# Patient Record
Sex: FEMALE | Race: WHITE | NOT HISPANIC OR LATINO
[De-identification: names, ages, dates, MRNs, and addresses within clinical notes are randomized per-mention and may not be internally consistent; named-entity substitution may affect disease eponyms.]

---

## 2017-03-10 ENCOUNTER — APPOINTMENT (OUTPATIENT)
Dept: RADIOLOGY | Facility: HOSPITAL | Age: 62
End: 2017-03-10

## 2017-03-10 ENCOUNTER — OUTPATIENT (OUTPATIENT)
Dept: OUTPATIENT SERVICES | Facility: HOSPITAL | Age: 62
LOS: 1 days | End: 2017-03-10
Payer: COMMERCIAL

## 2017-03-10 PROBLEM — Z00.00 ENCOUNTER FOR PREVENTIVE HEALTH EXAMINATION: Status: ACTIVE | Noted: 2017-03-10

## 2017-03-10 PROCEDURE — 74020: CPT

## 2017-03-10 PROCEDURE — 74020: CPT | Mod: 26

## 2017-03-13 ENCOUNTER — RESULT REVIEW (OUTPATIENT)
Age: 62
End: 2017-03-13

## 2017-03-13 ENCOUNTER — INPATIENT (INPATIENT)
Facility: HOSPITAL | Age: 62
LOS: 1 days | Discharge: HOME | End: 2017-03-15
Attending: SURGERY | Admitting: SURGERY
Payer: COMMERCIAL

## 2017-03-13 ENCOUNTER — APPOINTMENT (OUTPATIENT)
Dept: SURGERY | Facility: CLINIC | Age: 62
End: 2017-03-13

## 2017-03-13 VITALS
TEMPERATURE: 99 F | HEART RATE: 86 BPM | WEIGHT: 257.94 LBS | DIASTOLIC BLOOD PRESSURE: 82 MMHG | HEIGHT: 65 IN | RESPIRATION RATE: 16 BRPM | SYSTOLIC BLOOD PRESSURE: 142 MMHG

## 2017-03-13 VITALS
DIASTOLIC BLOOD PRESSURE: 84 MMHG | TEMPERATURE: 98.4 F | WEIGHT: 258 LBS | HEART RATE: 80 BPM | SYSTOLIC BLOOD PRESSURE: 119 MMHG | HEIGHT: 65 IN | BODY MASS INDEX: 42.99 KG/M2

## 2017-03-13 DIAGNOSIS — Z78.9 OTHER SPECIFIED HEALTH STATUS: ICD-10-CM

## 2017-03-13 DIAGNOSIS — K46.9 UNSPECIFIED ABDOMINAL HERNIA WITHOUT OBSTRUCTION OR GANGRENE: ICD-10-CM

## 2017-03-13 DIAGNOSIS — Z83.3 FAMILY HISTORY OF DIABETES MELLITUS: ICD-10-CM

## 2017-03-13 DIAGNOSIS — R73.03 PREDIABETES.: ICD-10-CM

## 2017-03-13 DIAGNOSIS — Z81.8 FAMILY HISTORY OF OTHER MENTAL AND BEHAVIORAL DISORDERS: ICD-10-CM

## 2017-03-13 DIAGNOSIS — K46.9 UNSPECIFIED ABDOMINAL HERNIA W/OUT OBSTRUCTION OR GANGRENE: ICD-10-CM

## 2017-03-13 LAB
ALBUMIN SERPL ELPH-MCNC: 3.5 G/DL — SIGNIFICANT CHANGE UP (ref 3.3–5)
ALP SERPL-CCNC: 158 U/L — HIGH (ref 40–120)
ALT FLD-CCNC: 19 U/L — SIGNIFICANT CHANGE UP (ref 4–33)
APTT BLD: 31.3 SEC — SIGNIFICANT CHANGE UP (ref 27.5–37.4)
AST SERPL-CCNC: 17 U/L — SIGNIFICANT CHANGE UP (ref 4–32)
BASE EXCESS BLDV CALC-SCNC: 8.4 MMOL/L — SIGNIFICANT CHANGE UP
BASOPHILS # BLD AUTO: 0.04 K/UL — SIGNIFICANT CHANGE UP (ref 0–0.2)
BASOPHILS NFR BLD AUTO: 0.3 % — SIGNIFICANT CHANGE UP (ref 0–2)
BILIRUB SERPL-MCNC: 0.5 MG/DL — SIGNIFICANT CHANGE UP (ref 0.2–1.2)
BLD GP AB SCN SERPL QL: NEGATIVE — SIGNIFICANT CHANGE UP
BLOOD GAS VENOUS - CREATININE: 0.73 MG/DL — SIGNIFICANT CHANGE UP (ref 0.5–1.3)
BUN SERPL-MCNC: 13 MG/DL — SIGNIFICANT CHANGE UP (ref 7–23)
CALCIUM SERPL-MCNC: 8.9 MG/DL — SIGNIFICANT CHANGE UP (ref 8.4–10.5)
CHLORIDE BLDV-SCNC: 104 MMOL/L — SIGNIFICANT CHANGE UP (ref 96–108)
CHLORIDE SERPL-SCNC: 100 MMOL/L — SIGNIFICANT CHANGE UP (ref 98–107)
CO2 SERPL-SCNC: 29 MMOL/L — SIGNIFICANT CHANGE UP (ref 22–31)
CREAT SERPL-MCNC: 0.79 MG/DL — SIGNIFICANT CHANGE UP (ref 0.5–1.3)
EOSINOPHIL # BLD AUTO: 0.24 K/UL — SIGNIFICANT CHANGE UP (ref 0–0.5)
EOSINOPHIL NFR BLD AUTO: 2 % — SIGNIFICANT CHANGE UP (ref 0–6)
GAS PNL BLDV: 140 MMOL/L — SIGNIFICANT CHANGE UP (ref 136–146)
GLUCOSE BLDV-MCNC: 107 — HIGH (ref 70–99)
GLUCOSE SERPL-MCNC: 103 MG/DL — HIGH (ref 70–99)
HCO3 BLDV-SCNC: 30 MMOL/L — HIGH (ref 20–27)
HCT VFR BLD CALC: 39.4 % — SIGNIFICANT CHANGE UP (ref 34.5–45)
HCT VFR BLDV CALC: 39.9 % — SIGNIFICANT CHANGE UP (ref 34.5–45)
HGB BLD-MCNC: 12.9 G/DL — SIGNIFICANT CHANGE UP (ref 11.5–15.5)
HGB BLDV-MCNC: 13 G/DL — SIGNIFICANT CHANGE UP (ref 11.5–15.5)
IMM GRANULOCYTES NFR BLD AUTO: 0.3 % — SIGNIFICANT CHANGE UP (ref 0–1.5)
INR BLD: 1.15 — SIGNIFICANT CHANGE UP (ref 0.87–1.18)
LACTATE BLDV-MCNC: 1.7 MMOL/L — SIGNIFICANT CHANGE UP (ref 0.5–2)
LYMPHOCYTES # BLD AUTO: 17 % — SIGNIFICANT CHANGE UP (ref 13–44)
LYMPHOCYTES # BLD AUTO: 2.04 K/UL — SIGNIFICANT CHANGE UP (ref 1–3.3)
MCHC RBC-ENTMCNC: 29.1 PG — SIGNIFICANT CHANGE UP (ref 27–34)
MCHC RBC-ENTMCNC: 32.7 % — SIGNIFICANT CHANGE UP (ref 32–36)
MCV RBC AUTO: 88.9 FL — SIGNIFICANT CHANGE UP (ref 80–100)
MONOCYTES # BLD AUTO: 1.15 K/UL — HIGH (ref 0–0.9)
MONOCYTES NFR BLD AUTO: 9.6 % — SIGNIFICANT CHANGE UP (ref 2–14)
NEUTROPHILS # BLD AUTO: 8.46 K/UL — HIGH (ref 1.8–7.4)
NEUTROPHILS NFR BLD AUTO: 70.8 % — SIGNIFICANT CHANGE UP (ref 43–77)
PCO2 BLDV: 52 MMHG — HIGH (ref 41–51)
PH BLDV: 7.42 PH — SIGNIFICANT CHANGE UP (ref 7.32–7.43)
PLATELET # BLD AUTO: 408 K/UL — HIGH (ref 150–400)
PMV BLD: 10.8 FL — SIGNIFICANT CHANGE UP (ref 7–13)
PO2 BLDV: < 24 MMHG — LOW (ref 35–40)
POTASSIUM BLDV-SCNC: 3.6 MMOL/L — SIGNIFICANT CHANGE UP (ref 3.4–4.5)
POTASSIUM SERPL-MCNC: 3.6 MMOL/L — SIGNIFICANT CHANGE UP (ref 3.5–5.3)
POTASSIUM SERPL-SCNC: 3.6 MMOL/L — SIGNIFICANT CHANGE UP (ref 3.5–5.3)
PROT SERPL-MCNC: 7.7 G/DL — SIGNIFICANT CHANGE UP (ref 6–8.3)
PROTHROM AB SERPL-ACNC: 13.1 SEC — SIGNIFICANT CHANGE UP (ref 10–13.1)
RBC # BLD: 4.43 M/UL — SIGNIFICANT CHANGE UP (ref 3.8–5.2)
RBC # FLD: 14.7 % — HIGH (ref 10.3–14.5)
RH IG SCN BLD-IMP: POSITIVE — SIGNIFICANT CHANGE UP
SAO2 % BLDV: 23.5 % — LOW (ref 60–85)
SODIUM SERPL-SCNC: 145 MMOL/L — SIGNIFICANT CHANGE UP (ref 135–145)
WBC # BLD: 11.97 K/UL — HIGH (ref 3.8–10.5)
WBC # FLD AUTO: 11.97 K/UL — HIGH (ref 3.8–10.5)

## 2017-03-13 PROCEDURE — 44005 FREEING OF BOWEL ADHESION: CPT | Mod: GC

## 2017-03-13 PROCEDURE — 74177 CT ABD & PELVIS W/CONTRAST: CPT | Mod: 26

## 2017-03-13 PROCEDURE — 99222 1ST HOSP IP/OBS MODERATE 55: CPT | Mod: 57,GC

## 2017-03-13 PROCEDURE — 71020: CPT | Mod: 26

## 2017-03-13 PROCEDURE — 49561: CPT | Mod: GC

## 2017-03-13 RX ORDER — SODIUM CHLORIDE 9 MG/ML
1000 INJECTION INTRAMUSCULAR; INTRAVENOUS; SUBCUTANEOUS ONCE
Qty: 0 | Refills: 0 | Status: COMPLETED | OUTPATIENT
Start: 2017-03-13 | End: 2017-03-13

## 2017-03-13 RX ADMIN — SODIUM CHLORIDE 2000 MILLILITER(S): 9 INJECTION INTRAMUSCULAR; INTRAVENOUS; SUBCUTANEOUS at 21:55

## 2017-03-13 NOTE — ED PROVIDER NOTE - OBJECTIVE STATEMENT
61 F h/o prediabetes, hernia, with worsening of hernia since tuesday. Patient had vomiting last week which caused hernia to bulge. Was seen by Dr Ndiaye today and told to go to ER for surgical evaluation. patient has no vomiting since last week, no nausea, no pain except once when bending over, last BM at 3pm, +passing gas. No fevers/chills. No dysuria. NO lightheaded/palpitations. 61 F h/o prediabetes, hernia, with worsening of hernia since tuesday. Patient had vomiting last week which caused hernia to bulge. Was seen by Dr Ndiaye today and told to go to ER for surgical evaluation. patient has no vomiting since last week, no nausea, no pain except once when bending over, last BM at 3pm, +passing gas. No fevers/chills. No dysuria. NO lightheaded/palpitations.    20:52 Marte attF h/o borderline dm, umbilical hernia neg repair sent to ED from General Surgeon office concern for strangulated hernia. Patient has longstanding history of umbilical hernia. Six days ago patient had stomach flu, frequent biliious nonbloody emesis, emesis prompted large non-reducible umbilcal mass. Sharp pain when bending forward, neg n/v, last bm 5 hours prior, still passing gas. Denies fever, diarrhea, travel, abx, recent hospitalization. PMH as above PSH denies MED denies

## 2017-03-13 NOTE — ED ADULT TRIAGE NOTE - CHIEF COMPLAINT QUOTE
Pt comes from DR Ndiaye's office to be admitted  for incarcerated hernia, pt had a recent stomach virus which exacerbated her hernia requiring surgery

## 2017-03-13 NOTE — ED PROVIDER NOTE - PHYSICAL EXAMINATION
Estuardo att: nad, comfortable appearing, walking in room, aaox3, ctabl, rrr, s1s2, abd soft 15cm x 10cm non-reducible umbilcal mass, mottled periumbilical skin, neg le edema

## 2017-03-13 NOTE — ED PROVIDER NOTE - ATTENDING CONTRIBUTION TO CARE
61F refer from surgical office large nonreducible umbilical hernia, neg active pain declined pain meds, overlying skin mottled, suspected strangulated bowel, PLAN cbc cmp vbg preop labs ct abd pelvis po and iv contrast surgical consult likely or

## 2017-03-13 NOTE — ED PROVIDER NOTE - MEDICAL DECISION MAKING DETAILS
61 F with incarcerated hernia, concern for strangulation though clinically well appearing currently. will get pre-op labs incl lactate, ct abd with contrast and surgery consult 61 F with incarcerated hernia, concern for strangulation though clinically well appearing currently. will get pre-op labs incl lactate, ct abd with contrast and surgery consult Estuardo att: 61F refer from surgical office large nonreducible umbilical hernia, neg active pain declined pain meds, overlying skin mottled, suspected strangulated bowel, PLAN cbc cmp vbg preop labs ct abd pelvis po and iv contrast surgical consult likely or

## 2017-03-14 LAB
BUN SERPL-MCNC: 13 MG/DL — SIGNIFICANT CHANGE UP (ref 7–23)
BUN SERPL-MCNC: 9 MG/DL — SIGNIFICANT CHANGE UP (ref 7–23)
CALCIUM SERPL-MCNC: 8.3 MG/DL — LOW (ref 8.4–10.5)
CALCIUM SERPL-MCNC: 8.6 MG/DL — SIGNIFICANT CHANGE UP (ref 8.4–10.5)
CHLORIDE SERPL-SCNC: 99 MMOL/L — SIGNIFICANT CHANGE UP (ref 98–107)
CHLORIDE SERPL-SCNC: 99 MMOL/L — SIGNIFICANT CHANGE UP (ref 98–107)
CO2 SERPL-SCNC: 25 MMOL/L — SIGNIFICANT CHANGE UP (ref 22–31)
CO2 SERPL-SCNC: 31 MMOL/L — SIGNIFICANT CHANGE UP (ref 22–31)
CREAT SERPL-MCNC: 0.7 MG/DL — SIGNIFICANT CHANGE UP (ref 0.5–1.3)
CREAT SERPL-MCNC: 0.73 MG/DL — SIGNIFICANT CHANGE UP (ref 0.5–1.3)
GLUCOSE SERPL-MCNC: 102 MG/DL — HIGH (ref 70–99)
GLUCOSE SERPL-MCNC: 125 MG/DL — HIGH (ref 70–99)
HBA1C BLD-MCNC: 6.2 % — HIGH (ref 4–5.6)
HCT VFR BLD CALC: 35.2 % — SIGNIFICANT CHANGE UP (ref 34.5–45)
HGB BLD-MCNC: 11.3 G/DL — LOW (ref 11.5–15.5)
MAGNESIUM SERPL-MCNC: 1.9 MG/DL — SIGNIFICANT CHANGE UP (ref 1.6–2.6)
MCHC RBC-ENTMCNC: 28.5 PG — SIGNIFICANT CHANGE UP (ref 27–34)
MCHC RBC-ENTMCNC: 32.1 % — SIGNIFICANT CHANGE UP (ref 32–36)
MCV RBC AUTO: 88.9 FL — SIGNIFICANT CHANGE UP (ref 80–100)
PHOSPHATE SERPL-MCNC: 3.2 MG/DL — SIGNIFICANT CHANGE UP (ref 2.5–4.5)
PLATELET # BLD AUTO: 368 K/UL — SIGNIFICANT CHANGE UP (ref 150–400)
PMV BLD: 11.2 FL — SIGNIFICANT CHANGE UP (ref 7–13)
POTASSIUM SERPL-MCNC: 3.1 MMOL/L — LOW (ref 3.5–5.3)
POTASSIUM SERPL-MCNC: 4.2 MMOL/L — SIGNIFICANT CHANGE UP (ref 3.5–5.3)
POTASSIUM SERPL-SCNC: 3.1 MMOL/L — LOW (ref 3.5–5.3)
POTASSIUM SERPL-SCNC: 4.2 MMOL/L — SIGNIFICANT CHANGE UP (ref 3.5–5.3)
RBC # BLD: 3.96 M/UL — SIGNIFICANT CHANGE UP (ref 3.8–5.2)
RBC # FLD: 14.9 % — HIGH (ref 10.3–14.5)
SODIUM SERPL-SCNC: 142 MMOL/L — SIGNIFICANT CHANGE UP (ref 135–145)
SODIUM SERPL-SCNC: 142 MMOL/L — SIGNIFICANT CHANGE UP (ref 135–145)
WBC # BLD: 11.39 K/UL — HIGH (ref 3.8–10.5)
WBC # FLD AUTO: 11.39 K/UL — HIGH (ref 3.8–10.5)

## 2017-03-14 PROCEDURE — 88302 TISSUE EXAM BY PATHOLOGIST: CPT | Mod: 26

## 2017-03-14 RX ORDER — HEPARIN SODIUM 5000 [USP'U]/ML
7500 INJECTION INTRAVENOUS; SUBCUTANEOUS EVERY 8 HOURS
Qty: 0 | Refills: 0 | Status: DISCONTINUED | OUTPATIENT
Start: 2017-03-14 | End: 2017-03-15

## 2017-03-14 RX ORDER — DEXTROSE 50 % IN WATER 50 %
25 SYRINGE (ML) INTRAVENOUS ONCE
Qty: 0 | Refills: 0 | Status: DISCONTINUED | OUTPATIENT
Start: 2017-03-14 | End: 2017-03-14

## 2017-03-14 RX ORDER — DEXTROSE 50 % IN WATER 50 %
12.5 SYRINGE (ML) INTRAVENOUS ONCE
Qty: 0 | Refills: 0 | Status: DISCONTINUED | OUTPATIENT
Start: 2017-03-14 | End: 2017-03-14

## 2017-03-14 RX ORDER — INSULIN LISPRO 100/ML
VIAL (ML) SUBCUTANEOUS EVERY 6 HOURS
Qty: 0 | Refills: 0 | Status: DISCONTINUED | OUTPATIENT
Start: 2017-03-14 | End: 2017-03-14

## 2017-03-14 RX ORDER — ONDANSETRON 8 MG/1
4 TABLET, FILM COATED ORAL ONCE
Qty: 0 | Refills: 0 | Status: DISCONTINUED | OUTPATIENT
Start: 2017-03-14 | End: 2017-03-14

## 2017-03-14 RX ORDER — DEXTROSE 50 % IN WATER 50 %
1 SYRINGE (ML) INTRAVENOUS ONCE
Qty: 0 | Refills: 0 | Status: DISCONTINUED | OUTPATIENT
Start: 2017-03-14 | End: 2017-03-14

## 2017-03-14 RX ORDER — POTASSIUM CHLORIDE 20 MEQ
40 PACKET (EA) ORAL EVERY 4 HOURS
Qty: 0 | Refills: 0 | Status: COMPLETED | OUTPATIENT
Start: 2017-03-14 | End: 2017-03-14

## 2017-03-14 RX ORDER — INSULIN LISPRO 100/ML
VIAL (ML) SUBCUTANEOUS
Qty: 0 | Refills: 0 | Status: DISCONTINUED | OUTPATIENT
Start: 2017-03-14 | End: 2017-03-15

## 2017-03-14 RX ORDER — DEXTROSE MONOHYDRATE, SODIUM CHLORIDE, AND POTASSIUM CHLORIDE 50; .745; 4.5 G/1000ML; G/1000ML; G/1000ML
1000 INJECTION, SOLUTION INTRAVENOUS
Qty: 0 | Refills: 0 | Status: DISCONTINUED | OUTPATIENT
Start: 2017-03-14 | End: 2017-03-15

## 2017-03-14 RX ORDER — GLUCAGON INJECTION, SOLUTION 0.5 MG/.1ML
1 INJECTION, SOLUTION SUBCUTANEOUS ONCE
Qty: 0 | Refills: 0 | Status: DISCONTINUED | OUTPATIENT
Start: 2017-03-14 | End: 2017-03-14

## 2017-03-14 RX ORDER — SODIUM CHLORIDE 9 MG/ML
1000 INJECTION, SOLUTION INTRAVENOUS
Qty: 0 | Refills: 0 | Status: DISCONTINUED | OUTPATIENT
Start: 2017-03-14 | End: 2017-03-14

## 2017-03-14 RX ORDER — DEXTROSE MONOHYDRATE, SODIUM CHLORIDE, AND POTASSIUM CHLORIDE 50; .745; 4.5 G/1000ML; G/1000ML; G/1000ML
1000 INJECTION, SOLUTION INTRAVENOUS
Qty: 0 | Refills: 0 | Status: DISCONTINUED | OUTPATIENT
Start: 2017-03-14 | End: 2017-03-14

## 2017-03-14 RX ORDER — HYDROMORPHONE HYDROCHLORIDE 2 MG/ML
0.5 INJECTION INTRAMUSCULAR; INTRAVENOUS; SUBCUTANEOUS
Qty: 0 | Refills: 0 | Status: DISCONTINUED | OUTPATIENT
Start: 2017-03-14 | End: 2017-03-14

## 2017-03-14 RX ADMIN — HEPARIN SODIUM 7500 UNIT(S): 5000 INJECTION INTRAVENOUS; SUBCUTANEOUS at 13:32

## 2017-03-14 RX ADMIN — DEXTROSE MONOHYDRATE, SODIUM CHLORIDE, AND POTASSIUM CHLORIDE 50 MILLILITER(S): 50; .745; 4.5 INJECTION, SOLUTION INTRAVENOUS at 17:04

## 2017-03-14 RX ADMIN — SODIUM CHLORIDE 150 MILLILITER(S): 9 INJECTION, SOLUTION INTRAVENOUS at 10:45

## 2017-03-14 RX ADMIN — HEPARIN SODIUM 7500 UNIT(S): 5000 INJECTION INTRAVENOUS; SUBCUTANEOUS at 05:46

## 2017-03-14 RX ADMIN — HYDROMORPHONE HYDROCHLORIDE 0.5 MILLIGRAM(S): 2 INJECTION INTRAMUSCULAR; INTRAVENOUS; SUBCUTANEOUS at 03:59

## 2017-03-14 RX ADMIN — Medication 40 MILLIEQUIVALENT(S): at 13:32

## 2017-03-14 RX ADMIN — HYDROMORPHONE HYDROCHLORIDE 0.5 MILLIGRAM(S): 2 INJECTION INTRAMUSCULAR; INTRAVENOUS; SUBCUTANEOUS at 03:45

## 2017-03-14 RX ADMIN — Medication 40 MILLIEQUIVALENT(S): at 11:06

## 2017-03-14 RX ADMIN — HEPARIN SODIUM 7500 UNIT(S): 5000 INJECTION INTRAVENOUS; SUBCUTANEOUS at 21:36

## 2017-03-14 NOTE — H&P ADULT. - HISTORY OF PRESENT ILLNESS
61F prediabetic, obese, no PSHx p/w severe abdominal pain for the past week over a known ventral hernia which she has had for the past 19 years. The patient has no obstructive symptoms. Only c/o pain and recently noted skin changes over the hernia.

## 2017-03-14 NOTE — PRE-OP CHECKLIST - PATIENT'S PERSONAL PROPERTY REMOVED
glasses/cell phone, laptop, 3 yellow-toned rings cell phone, laptop, 3 yellow-toned rings, suitcase with clothing/toiletries/glasses

## 2017-03-14 NOTE — H&P ADULT. - ASSESSMENT
61F obese, prediabetes no w/ incarcerated ventral hernia, concern for strangulation  - NPO  - IVF  - Pain control  - OR plan for urgent reduction and hernia repair.  - DVT PPx

## 2017-03-14 NOTE — BRIEF OPERATIVE NOTE - OPERATION/FINDINGS
OPERATION: repair of incarcerated ventral hernia    FINDINGS: erythematous overlying skin; hernia sac opened - infarcted omentum, viable small bowel, and ascites; hernia sac resected with Ligasure; fascia closed primarily with interrupted figure of eights; subcutaneous tissue approximated w/ Vicryl sutures; skin closed w/ staples

## 2017-03-15 ENCOUNTER — TRANSCRIPTION ENCOUNTER (OUTPATIENT)
Age: 62
End: 2017-03-15

## 2017-03-15 VITALS
OXYGEN SATURATION: 97 % | TEMPERATURE: 99 F | DIASTOLIC BLOOD PRESSURE: 80 MMHG | HEART RATE: 86 BPM | SYSTOLIC BLOOD PRESSURE: 122 MMHG | RESPIRATION RATE: 18 BRPM

## 2017-03-15 LAB
BUN SERPL-MCNC: 6 MG/DL — LOW (ref 7–23)
CALCIUM SERPL-MCNC: 7.8 MG/DL — LOW (ref 8.4–10.5)
CHLORIDE SERPL-SCNC: 102 MMOL/L — SIGNIFICANT CHANGE UP (ref 98–107)
CO2 SERPL-SCNC: 26 MMOL/L — SIGNIFICANT CHANGE UP (ref 22–31)
CREAT SERPL-MCNC: 0.64 MG/DL — SIGNIFICANT CHANGE UP (ref 0.5–1.3)
GLUCOSE SERPL-MCNC: 128 MG/DL — HIGH (ref 70–99)
HCT VFR BLD CALC: 35.8 % — SIGNIFICANT CHANGE UP (ref 34.5–45)
HGB BLD-MCNC: 11.3 G/DL — LOW (ref 11.5–15.5)
MAGNESIUM SERPL-MCNC: 1.9 MG/DL — SIGNIFICANT CHANGE UP (ref 1.6–2.6)
MCHC RBC-ENTMCNC: 28.3 PG — SIGNIFICANT CHANGE UP (ref 27–34)
MCHC RBC-ENTMCNC: 31.6 % — LOW (ref 32–36)
MCV RBC AUTO: 89.5 FL — SIGNIFICANT CHANGE UP (ref 80–100)
PHOSPHATE SERPL-MCNC: 3.7 MG/DL — SIGNIFICANT CHANGE UP (ref 2.5–4.5)
PLATELET # BLD AUTO: 378 K/UL — SIGNIFICANT CHANGE UP (ref 150–400)
PMV BLD: 10.8 FL — SIGNIFICANT CHANGE UP (ref 7–13)
POTASSIUM SERPL-MCNC: 3.5 MMOL/L — SIGNIFICANT CHANGE UP (ref 3.5–5.3)
POTASSIUM SERPL-SCNC: 3.5 MMOL/L — SIGNIFICANT CHANGE UP (ref 3.5–5.3)
RBC # BLD: 4 M/UL — SIGNIFICANT CHANGE UP (ref 3.8–5.2)
RBC # FLD: 14.9 % — HIGH (ref 10.3–14.5)
SODIUM SERPL-SCNC: 141 MMOL/L — SIGNIFICANT CHANGE UP (ref 135–145)
WBC # BLD: 10.26 K/UL — SIGNIFICANT CHANGE UP (ref 3.8–10.5)
WBC # FLD AUTO: 10.26 K/UL — SIGNIFICANT CHANGE UP (ref 3.8–10.5)

## 2017-03-15 RX ORDER — OXYCODONE AND ACETAMINOPHEN 5; 325 MG/1; MG/1
1 TABLET ORAL
Qty: 12 | Refills: 0
Start: 2017-03-15 | End: 2017-03-18

## 2017-03-15 RX ORDER — POTASSIUM CHLORIDE 20 MEQ
40 PACKET (EA) ORAL ONCE
Qty: 0 | Refills: 0 | Status: COMPLETED | OUTPATIENT
Start: 2017-03-15 | End: 2017-03-15

## 2017-03-15 RX ORDER — OXYCODONE HYDROCHLORIDE 5 MG/1
1 TABLET ORAL
Qty: 12 | Refills: 0 | OUTPATIENT
Start: 2017-03-15 | End: 2017-03-18

## 2017-03-15 RX ORDER — CALCIUM CARBONATE 500(1250)
1 TABLET ORAL
Qty: 0 | Refills: 0 | COMMUNITY
Start: 2017-03-15

## 2017-03-15 RX ORDER — CALCIUM CARBONATE 500(1250)
1 TABLET ORAL EVERY 4 HOURS
Qty: 0 | Refills: 0 | Status: DISCONTINUED | OUTPATIENT
Start: 2017-03-15 | End: 2017-03-15

## 2017-03-15 RX ADMIN — HEPARIN SODIUM 7500 UNIT(S): 5000 INJECTION INTRAVENOUS; SUBCUTANEOUS at 05:58

## 2017-03-15 RX ADMIN — Medication 40 MILLIEQUIVALENT(S): at 08:14

## 2017-03-15 NOTE — DISCHARGE NOTE ADULT - CARE PROVIDERS DIRECT ADDRESSES
[email or web address]

## 2017-03-15 NOTE — DISCHARGE NOTE ADULT - HOSPITAL COURSE
61F prediabetic, obese, no PSHx p/w severe abdominal pain for the past week over a known ventral hernia which she has had for the past 19 years. The patient has no obstructive symptoms. Only c/o pain and recently noted skin changes over the hernia.  3/14: Patient taken to operating room for ventral hernia repair. Postoperatively patient taken to PACU and transferred to floor after PACU criteria met. Diet was advanced to clears. Patient is ambulating on own and voiding on own.  3/15: Diet advanced to regular. Vital signs remain stable. Pain well controlled on current pain medications. Patient stable for discharge home with outpatient follow up with surgeon within one week.   istop#: [id number]

## 2017-03-15 NOTE — DISCHARGE NOTE ADULT - MEDICATION SUMMARY - MEDICATIONS TO TAKE
I will START or STAY ON the medications listed below when I get home from the hospital:    acetaminophen-oxyCODONE 325 mg-5 mg oral tablet  -- 1 tab(s) by mouth every 6 hours, As Needed -for moderate to severe pain MDD:4  -- Caution federal law prohibits the transfer of this drug to any person other  than the person for whom it was prescribed.  May cause drowsiness.  Alcohol may intensify this effect.  Use care when operating dangerous machinery.  This prescription cannot be refilled.  This product contains acetaminophen.  Do not use  with any other product containing acetaminophen to prevent possible liver damage.  Using more of this medication than prescribed may cause serious breathing problems.    -- Indication: For moderate to severe pain    calcium carbonate 500 mg (200 mg elemental calcium) oral tablet, chewable  -- 1 tab(s) by mouth every 4 hours, As needed, Heartburn  -- Indication: For As needed for heartburn    metFORMIN 500 mg oral tablet  -- 1 tab(s) by mouth every other day  -- Indication: For DM I will START or STAY ON the medications listed below when I get home from the hospital:    acetaminophen-oxyCODONE 325 mg-5 mg oral tablet  -- 1 tab(s) by mouth every 6 hours, As Needed -for moderate to severe pain MDD:4  -- Caution federal law prohibits the transfer of this drug to any person other  than the person for whom it was prescribed.  May cause drowsiness.  Alcohol may intensify this effect.  Use care when operating dangerous machinery.  This prescription cannot be refilled.  This product contains acetaminophen.  Do not use  with any other product containing acetaminophen to prevent possible liver damage.  Using more of this medication than prescribed may cause serious breathing problems.    -- Indication: For As needed for pain    calcium carbonate 500 mg (200 mg elemental calcium) oral tablet, chewable  -- 1 tab(s) by mouth every 4 hours, As needed, Heartburn  -- Indication: For As needed for heartburn    metFORMIN 500 mg oral tablet  -- 1 tab(s) by mouth every other day  -- Indication: For DM

## 2017-03-15 NOTE — DISCHARGE NOTE ADULT - CARE PLAN
Principal Discharge DX:	Hernia  Goal:	You had surgery, pain control  Instructions for follow-up, activity and diet:	WOUND CARE:  Keep incision clean, dry and in tact   BATHING: Please do not submerge wound underwater. You may shower and/or sponge bathe.  ACTIVITY: No heavy lifting or straining. Otherwise, you may return to your usual level of physical activity. If you are taking narcotic pain medication (such as Percocet) DO NOT drive a car, operate machinery or make important decisions.  DIET: Return to your usual diet.  NOTIFY YOUR SURGEON IF: You have any bleeding that does not stop, any pus draining from your wound(s), any fever (over 100.4 F) or chills, persistent nausea/vomiting, persistent diarrhea, or if your pain is not controlled on your discharge pain medications.  FOLLOW-UP: Please follow up with your primary care physician in one week regarding your hospitalization. Please call Dr. Ndiaye to make an appointment in one week [phone] Principal Discharge DX:	Hernia  Goal:	You had surgery, pain control  Instructions for follow-up, activity and diet:	WOUND CARE:  Keep incision clean, dry and in tact   BATHING: Please do not submerge wound underwater. You may shower and/or sponge bathe.  ACTIVITY: No heavy lifting or straining. Otherwise, you may return to your usual level of physical activity. If you are taking narcotic pain medication (such as Percocet) DO NOT drive a car, operate machinery or make important decisions.  DIET: Return to your usual diet.  NOTIFY YOUR SURGEON IF: You have any bleeding that does not stop, any pus draining from your wound(s), any fever (over 100.4 F) or chills, persistent nausea/vomiting, persistent diarrhea, or if your pain is not controlled on your discharge pain medications.  FOLLOW-UP: Please follow up with your primary care physician in one week regarding your hospitalization. Please call Dr. Ndiaye to make an appointment in one week [phone] Principal Discharge DX:	Hernia  Goal:	You had surgery, pain control  Instructions for follow-up, activity and diet:	WOUND CARE:  Keep incision clean, dry and in tact   BATHING: Please do not submerge wound underwater. You may shower and/or sponge bathe.  ACTIVITY: No heavy lifting or straining. Otherwise, you may return to your usual level of physical activity. If you are taking narcotic pain medication (such as Percocet) DO NOT drive a car, operate machinery or make important decisions.  DIET: Return to your usual diet.  NOTIFY YOUR SURGEON IF: You have any bleeding that does not stop, any pus draining from your wound(s), any fever (over 100.4 F) or chills, persistent nausea/vomiting, persistent diarrhea, or if your pain is not controlled on your discharge pain medications.  FOLLOW-UP: Please follow up with your primary care physician in one week regarding your hospitalization. Please call Dr. Ndiaye to make an appointment in one week [phone]

## 2017-03-15 NOTE — DISCHARGE NOTE ADULT - ADDITIONAL INSTRUCTIONS
Please call Dr. Ndiaye to make an appointment in one week [phone]   Please follow up with primary medical doctor within one week of discharge regarding hospitalization.

## 2017-03-15 NOTE — DISCHARGE NOTE ADULT - CARE PROVIDER_API CALL
Myron Ndiaye), ColonRectal Surgery; Surgery  [address] [place] [address]  Phone: [phone]  Fax: [phone]

## 2017-03-15 NOTE — DISCHARGE NOTE ADULT - PATIENT PORTAL LINK FT
“You can access the FollowHealth Patient Portal, offered by [facility], by registering with the following website: [email or web address]”

## 2017-03-15 NOTE — DISCHARGE NOTE ADULT - PLAN OF CARE
You had surgery, pain control WOUND CARE:  Keep incision clean, dry and in tact   BATHING: Please do not submerge wound underwater. You may shower and/or sponge bathe.  ACTIVITY: No heavy lifting or straining. Otherwise, you may return to your usual level of physical activity. If you are taking narcotic pain medication (such as Percocet) DO NOT drive a car, operate machinery or make important decisions.  DIET: Return to your usual diet.  NOTIFY YOUR SURGEON IF: You have any bleeding that does not stop, any pus draining from your wound(s), any fever (over 100.4 F) or chills, persistent nausea/vomiting, persistent diarrhea, or if your pain is not controlled on your discharge pain medications.  FOLLOW-UP: Please follow up with your primary care physician in one week regarding your hospitalization. Please call Dr. Ndiaye to make an appointment in one week [phone]

## 2017-03-20 LAB — SURGICAL PATHOLOGY STUDY: SIGNIFICANT CHANGE UP

## 2017-03-22 ENCOUNTER — APPOINTMENT (OUTPATIENT)
Dept: SURGERY | Facility: CLINIC | Age: 62
End: 2017-03-22

## 2017-03-22 VITALS
WEIGHT: 258 LBS | TEMPERATURE: 97.6 F | HEIGHT: 65 IN | SYSTOLIC BLOOD PRESSURE: 148 MMHG | DIASTOLIC BLOOD PRESSURE: 84 MMHG | HEART RATE: 68 BPM | BODY MASS INDEX: 42.99 KG/M2

## 2017-03-27 ENCOUNTER — APPOINTMENT (OUTPATIENT)
Dept: SURGERY | Facility: CLINIC | Age: 62
End: 2017-03-27

## 2017-03-27 VITALS
TEMPERATURE: 98 F | HEIGHT: 65 IN | BODY MASS INDEX: 42.99 KG/M2 | DIASTOLIC BLOOD PRESSURE: 73 MMHG | HEART RATE: 72 BPM | WEIGHT: 258 LBS | SYSTOLIC BLOOD PRESSURE: 125 MMHG

## 2017-04-12 ENCOUNTER — APPOINTMENT (OUTPATIENT)
Dept: SURGERY | Facility: CLINIC | Age: 62
End: 2017-04-12

## 2017-04-12 VITALS
HEART RATE: 70 BPM | SYSTOLIC BLOOD PRESSURE: 148 MMHG | HEIGHT: 65 IN | WEIGHT: 258 LBS | DIASTOLIC BLOOD PRESSURE: 84 MMHG | BODY MASS INDEX: 42.99 KG/M2

## 2017-04-12 DIAGNOSIS — Z09 ENCOUNTER FOR FOLLOW-UP EXAMINATION AFTER COMPLETED TREATMENT FOR CONDITIONS OTHER THAN MALIGNANT NEOPLASM: ICD-10-CM

## 2017-05-10 ENCOUNTER — APPOINTMENT (OUTPATIENT)
Dept: SURGERY | Facility: CLINIC | Age: 62
End: 2017-05-10

## 2017-05-10 VITALS
TEMPERATURE: 97.9 F | BODY MASS INDEX: 42.99 KG/M2 | HEART RATE: 67 BPM | HEIGHT: 65 IN | DIASTOLIC BLOOD PRESSURE: 91 MMHG | SYSTOLIC BLOOD PRESSURE: 151 MMHG | WEIGHT: 258 LBS

## 2017-11-02 PROBLEM — R73.03 PREDIABETES: Chronic | Status: ACTIVE | Noted: 2017-03-13

## 2017-11-30 ENCOUNTER — APPOINTMENT (OUTPATIENT)
Dept: CARDIOLOGY | Facility: CLINIC | Age: 62
End: 2017-11-30
Payer: COMMERCIAL

## 2017-11-30 ENCOUNTER — NON-APPOINTMENT (OUTPATIENT)
Age: 62
End: 2017-11-30

## 2017-11-30 VITALS
SYSTOLIC BLOOD PRESSURE: 160 MMHG | HEIGHT: 65 IN | OXYGEN SATURATION: 97 % | DIASTOLIC BLOOD PRESSURE: 85 MMHG | HEART RATE: 73 BPM

## 2017-11-30 VITALS — SYSTOLIC BLOOD PRESSURE: 147 MMHG | DIASTOLIC BLOOD PRESSURE: 82 MMHG

## 2017-11-30 DIAGNOSIS — E78.5 HYPERLIPIDEMIA, UNSPECIFIED: ICD-10-CM

## 2017-11-30 DIAGNOSIS — Z86.79 PERSONAL HISTORY OF OTHER DISEASES OF THE CIRCULATORY SYSTEM: ICD-10-CM

## 2017-11-30 DIAGNOSIS — R73.01 IMPAIRED FASTING GLUCOSE: ICD-10-CM

## 2017-11-30 DIAGNOSIS — M85.80 OTHER SPECIFIED DISORDERS OF BONE DENSITY AND STRUCTURE, UNSPECIFIED SITE: ICD-10-CM

## 2017-11-30 DIAGNOSIS — R70.0 ELEVATED ERYTHROCYTE SEDIMENTATION RATE: ICD-10-CM

## 2017-11-30 PROCEDURE — 99244 OFF/OP CNSLTJ NEW/EST MOD 40: CPT

## 2017-11-30 PROCEDURE — 93306 TTE W/DOPPLER COMPLETE: CPT

## 2017-11-30 PROCEDURE — 93000 ELECTROCARDIOGRAM COMPLETE: CPT

## 2017-11-30 RX ORDER — METFORMIN HYDROCHLORIDE 625 MG/1
TABLET ORAL
Refills: 0 | Status: DISCONTINUED | COMMUNITY
End: 2017-11-30

## 2020-04-28 ENCOUNTER — APPOINTMENT (OUTPATIENT)
Dept: ULTRASOUND IMAGING | Facility: IMAGING CENTER | Age: 65
End: 2020-04-28
Payer: COMMERCIAL

## 2020-04-28 ENCOUNTER — OUTPATIENT (OUTPATIENT)
Dept: OUTPATIENT SERVICES | Facility: HOSPITAL | Age: 65
LOS: 1 days | End: 2020-04-28
Payer: COMMERCIAL

## 2020-04-28 DIAGNOSIS — Z00.8 ENCOUNTER FOR OTHER GENERAL EXAMINATION: ICD-10-CM

## 2020-04-28 PROCEDURE — 76830 TRANSVAGINAL US NON-OB: CPT

## 2020-04-28 PROCEDURE — 76856 US EXAM PELVIC COMPLETE: CPT

## 2020-04-28 PROCEDURE — 76856 US EXAM PELVIC COMPLETE: CPT | Mod: 26

## 2020-04-28 PROCEDURE — 76830 TRANSVAGINAL US NON-OB: CPT | Mod: 26

## 2020-05-07 ENCOUNTER — RESULT REVIEW (OUTPATIENT)
Age: 65
End: 2020-05-07

## 2021-06-24 ENCOUNTER — NON-APPOINTMENT (OUTPATIENT)
Age: 66
End: 2021-06-24

## 2021-06-24 ENCOUNTER — APPOINTMENT (OUTPATIENT)
Dept: CARDIOLOGY | Facility: CLINIC | Age: 66
End: 2021-06-24
Payer: COMMERCIAL

## 2021-06-24 VITALS
HEART RATE: 77 BPM | DIASTOLIC BLOOD PRESSURE: 88 MMHG | RESPIRATION RATE: 16 BRPM | HEIGHT: 64 IN | SYSTOLIC BLOOD PRESSURE: 147 MMHG | OXYGEN SATURATION: 95 % | TEMPERATURE: 97.6 F

## 2021-06-24 VITALS — SYSTOLIC BLOOD PRESSURE: 140 MMHG | DIASTOLIC BLOOD PRESSURE: 82 MMHG

## 2021-06-24 DIAGNOSIS — R94.31 ABNORMAL ELECTROCARDIOGRAM [ECG] [EKG]: ICD-10-CM

## 2021-06-24 DIAGNOSIS — Z87.891 PERSONAL HISTORY OF NICOTINE DEPENDENCE: ICD-10-CM

## 2021-06-24 PROCEDURE — 99072 ADDL SUPL MATRL&STAF TM PHE: CPT

## 2021-06-24 PROCEDURE — 93000 ELECTROCARDIOGRAM COMPLETE: CPT

## 2021-06-24 PROCEDURE — 93306 TTE W/DOPPLER COMPLETE: CPT

## 2021-06-24 PROCEDURE — 99204 OFFICE O/P NEW MOD 45 MIN: CPT

## 2021-06-24 NOTE — PHYSICAL EXAM
[Well Nourished] : well nourished [No Acute Distress] : no acute distress [Obese] : obese [Normal] : soft, non-tender, no masses/organomegaly, normal bowel sounds

## 2021-06-24 NOTE — ASSESSMENT
[FreeTextEntry1] : 65-year-old woman with history of morbid obesity improved.  She has dyspnea on exertion.  History of hypertension and elevated glucose not currently on medication.

## 2021-06-24 NOTE — DISCUSSION/SUMMARY
[FreeTextEntry1] : Discussed with patient.  Recommended blood work-up and follow with PMD.  Diet exercise and weight loss.  Nuclear stress testing and echocardiography regarding her dyspnea.

## 2021-06-24 NOTE — REASON FOR VISIT
[Symptom and Test Evaluation] : symptom and test evaluation [CV Risk Factors and Non-Cardiac Disease] : CV risk factors and non-cardiac disease [FreeTextEntry1] : Seen after several year hiatus.  History of morbid obesity elevated blood pressure previously.  Adheres to a Addie Christian diet.  Is no longer on medications.\par \par She has some dyspnea with exertion which she attributes to her weight.  She has no chest pain or palpitations.

## 2021-07-20 ENCOUNTER — APPOINTMENT (OUTPATIENT)
Dept: MAMMOGRAPHY | Facility: CLINIC | Age: 66
End: 2021-07-20

## 2021-07-29 ENCOUNTER — APPOINTMENT (OUTPATIENT)
Dept: CARDIOLOGY | Facility: CLINIC | Age: 66
End: 2021-07-29
Payer: COMMERCIAL

## 2021-07-29 PROCEDURE — 78452 HT MUSCLE IMAGE SPECT MULT: CPT

## 2021-07-29 PROCEDURE — 93015 CV STRESS TEST SUPVJ I&R: CPT

## 2021-07-29 PROCEDURE — A9500: CPT

## 2021-08-05 ENCOUNTER — APPOINTMENT (OUTPATIENT)
Dept: CARDIOLOGY | Facility: CLINIC | Age: 66
End: 2021-08-05
Payer: COMMERCIAL

## 2021-08-05 PROCEDURE — ZZZZZ: CPT

## 2021-08-12 ENCOUNTER — APPOINTMENT (OUTPATIENT)
Dept: CARDIOLOGY | Facility: CLINIC | Age: 66
End: 2021-08-12
Payer: COMMERCIAL

## 2021-08-12 VITALS
SYSTOLIC BLOOD PRESSURE: 171 MMHG | RESPIRATION RATE: 16 BRPM | OXYGEN SATURATION: 98 % | DIASTOLIC BLOOD PRESSURE: 88 MMHG | TEMPERATURE: 98.1 F | HEART RATE: 77 BPM | HEIGHT: 64 IN

## 2021-08-12 VITALS — SYSTOLIC BLOOD PRESSURE: 152 MMHG | DIASTOLIC BLOOD PRESSURE: 84 MMHG

## 2021-08-12 VITALS — SYSTOLIC BLOOD PRESSURE: 159 MMHG | DIASTOLIC BLOOD PRESSURE: 92 MMHG

## 2021-08-12 DIAGNOSIS — Z86.79 PERSONAL HISTORY OF OTHER DISEASES OF THE CIRCULATORY SYSTEM: ICD-10-CM

## 2021-08-12 DIAGNOSIS — R06.00 DYSPNEA, UNSPECIFIED: ICD-10-CM

## 2021-08-12 PROCEDURE — 99213 OFFICE O/P EST LOW 20 MIN: CPT

## 2021-08-12 NOTE — ASSESSMENT
[FreeTextEntry1] : Obesity.  Elevated blood pressure probable hypertension.  Negative nuclear stress testing.  Elevated cardiac risk

## 2021-08-12 NOTE — CARDIOLOGY SUMMARY
[de-identified] : June 24, 2021 sinus with nonspecific ST-T change [de-identified] : July 2021 normal nuclear stress study [de-identified] : June 2021 normal LV systolic function

## 2021-08-12 NOTE — HISTORY OF PRESENT ILLNESS
[FreeTextEntry1] : Seen for follow-up and discussion.  Nuclear stress study was negative.  Echocardiography with normal LV systolic function.\par \par She continues Addie Christian diet.  She goes for walks without symptomatology.

## 2021-08-12 NOTE — DISCUSSION/SUMMARY
[FreeTextEntry1] : She does not wish antihypertensive therapy at this time.  Advised to follow-up with her primary physician also to monitor her blood pressure at home consider antihypertensive regimen.  Diet and weight loss.  Limitations of stress testing discussed as well as cardiac prognosis based on the normal stress test.  Questions addressed with her.  Follow-up with me as needed.

## 2021-08-23 ENCOUNTER — APPOINTMENT (OUTPATIENT)
Dept: MAMMOGRAPHY | Facility: CLINIC | Age: 66
End: 2021-08-23
Payer: COMMERCIAL

## 2021-08-23 ENCOUNTER — APPOINTMENT (OUTPATIENT)
Dept: ULTRASOUND IMAGING | Facility: CLINIC | Age: 66
End: 2021-08-23
Payer: COMMERCIAL

## 2021-08-23 PROCEDURE — 77063 BREAST TOMOSYNTHESIS BI: CPT

## 2021-08-23 PROCEDURE — 77067 SCR MAMMO BI INCL CAD: CPT

## 2021-08-23 PROCEDURE — 76641 ULTRASOUND BREAST COMPLETE: CPT | Mod: 50

## 2022-01-10 ENCOUNTER — APPOINTMENT (OUTPATIENT)
Dept: RADIOLOGY | Facility: CLINIC | Age: 67
End: 2022-01-10
Payer: COMMERCIAL

## 2022-01-10 PROCEDURE — 77080 DXA BONE DENSITY AXIAL: CPT

## 2022-08-11 NOTE — ED PROVIDER NOTE - CROS ED CONS ALL NEG
right sided chest pain, no tachycardia, no tachypnea, no hypoxia, no SOB. doubt ACS. doubt PE, doubt dissection  -check labs  -ekg  -pepcid, maalox  -cxr negative...

## 2022-08-19 ENCOUNTER — APPOINTMENT (OUTPATIENT)
Dept: MAMMOGRAPHY | Facility: CLINIC | Age: 67
End: 2022-08-19

## 2022-08-19 ENCOUNTER — APPOINTMENT (OUTPATIENT)
Dept: ULTRASOUND IMAGING | Facility: CLINIC | Age: 67
End: 2022-08-19

## 2022-08-19 PROCEDURE — 76641 ULTRASOUND BREAST COMPLETE: CPT | Mod: 50

## 2022-08-19 PROCEDURE — 77063 BREAST TOMOSYNTHESIS BI: CPT

## 2022-08-19 PROCEDURE — 77067 SCR MAMMO BI INCL CAD: CPT

## 2022-09-07 ENCOUNTER — APPOINTMENT (OUTPATIENT)
Dept: ORTHOPEDIC SURGERY | Facility: CLINIC | Age: 67
End: 2022-09-07

## 2022-09-07 VITALS — HEIGHT: 64 IN

## 2022-09-07 PROCEDURE — 73564 X-RAY EXAM KNEE 4 OR MORE: CPT | Mod: LT

## 2022-09-07 PROCEDURE — 20611 DRAIN/INJ JOINT/BURSA W/US: CPT

## 2022-09-07 PROCEDURE — J3490M: CUSTOM

## 2022-09-07 PROCEDURE — 99214 OFFICE O/P EST MOD 30 MIN: CPT | Mod: 25

## 2022-09-07 NOTE — PROCEDURE
[Large Joint Injection] : Large joint injection [Left] : of the left [Knee] : knee [Inflammation] : inflammation [X-ray evidence of Osteoarthritis on this or prior visit] : x-ray evidence of Osteoarthritis on this or prior visit [Betadine] : betadine [Ethyl Chloride sprayed topically] : ethyl chloride sprayed topically [Sterile technique used] : sterile technique used [___ cc    3mg] :  Betamethasone (Celestone) ~Vcc of 3mg [___ cc    0.25%] : Bupivacaine (Marcaine) ~Vcc of 0.25%  [] : Patient tolerated procedure well [Call if redness, pain or fever occur] : call if redness, pain or fever occur [Apply ice for 15min out of every hour for the next 12-24 hours as tolerated] : apply ice for 15 minutes out of every hour for the next 12-24 hours as tolerated [Previous OTC use and PT nontherapeutic] : patient has tried OTC's including aspirin, Ibuprofen, Aleve, etc or prescription NSAIDS, and/or exercises at home and/or physical therapy without satisfactory response [Risks, benefits, alternatives discussed / Verbal consent obtained] : the risks benefits, and alternatives have been discussed, and verbal consent was obtained [Precise injection in area of tear] : precise injection in area of tear [All ultrasound images have been permanently captured and stored accordingly in our picture archiving and communication system] : All ultrasound images have been permanently captured and stored accordingly in our picture archiving and communication system [Visualization of the needle and placement of injection was performed without complication] : visualization of the needle and placement of injection was performed without complication [Pain] : pain

## 2022-09-13 NOTE — PHYSICAL EXAM
[5___] : hamstring 5[unfilled]/5 [Left] : left knee [All Views] : anteroposterior, lateral, skyline, and anteroposterior standing [advanced tricompartmental OA with lateral compartment narrowing and valgus alignment] : advanced tricompartmental OA with lateral compartment narrowing and valgus alignment [] : non-antalgic

## 2022-09-13 NOTE — DISCUSSION/SUMMARY
[Medication Risks Reviewed] : Medication risks reviewed [de-identified] : discussed risks of surgical treatment and nonsurgical treatment of arthritis, discussed risks of steroid injection plus or minus viscosupplementation, risks of zilretta and benefits, role of surgery and mri, risks and role of nsaids and side effects, benefits of therapy\par advised patient she is bone on bone medially, the arthritis has not bothered her until now therefore recommend starting with steroid injection to help with the pain and inflammation\par The risks, benefits and contents of the injection have been discussed.  Risks include but are not limited to allergic reaction, flare reaction, permanent white skin discoloration at the injection site and infection.  The patient understands the risks and agrees to having the injection.  All questions have been answered.\par Celestone LT knee Discussed the timing of the injections and the follow up that is needed. Advised the pt to ice the area that was injected and informed the pt it may take a few days for the injection to give relief.\par follow up if the pain conts we can discuss further injection options

## 2022-09-28 ENCOUNTER — APPOINTMENT (OUTPATIENT)
Dept: ORTHOPEDIC SURGERY | Facility: CLINIC | Age: 67
End: 2022-09-28

## 2022-09-28 VITALS — WEIGHT: 258 LBS | HEIGHT: 64 IN | BODY MASS INDEX: 44.05 KG/M2

## 2022-09-28 VITALS — HEIGHT: 64 IN | BODY MASS INDEX: 44.05 KG/M2 | WEIGHT: 258 LBS

## 2022-09-28 DIAGNOSIS — M17.12 UNILATERAL PRIMARY OSTEOARTHRITIS, LEFT KNEE: ICD-10-CM

## 2022-09-28 DIAGNOSIS — M23.92 UNSPECIFIED INTERNAL DERANGEMENT OF LEFT KNEE: ICD-10-CM

## 2022-09-28 PROCEDURE — 99213 OFFICE O/P EST LOW 20 MIN: CPT

## 2022-10-02 NOTE — DISCUSSION/SUMMARY
[Medication Risks Reviewed] : Medication risks reviewed [Surgical risks reviewed] : Surgical risks reviewed [de-identified] : patient has felt great relief from the celestone injection and is not in pain therefore recommend holding off the gel injection series. patient has gotte auth for the visco but will use to patients advantage when the pain comes back\par \par discussed risks of surgical treatment and nonsurgical treatment of arthritis, discussed risks of steroid injection plus or minus viscosupplementation, risks of zilretta and benefits, role of surgery and mri, risks and role of nsaids and side effects, benefits of therapy\par \par patient will follow up when she would like to proceed with gel injections \par she will also like to return to eval the right knee, will get xrays

## 2022-10-02 NOTE — HISTORY OF PRESENT ILLNESS
[de-identified] : patient is here for  follow up Visit of the left knee. the pain in the left knee has improved. pt had steroid  injection in the left knee on her last Visit and it  has helped.  pt has no complaints after the injection in the left knee

## 2023-03-31 ENCOUNTER — APPOINTMENT (OUTPATIENT)
Dept: SPINE | Facility: CLINIC | Age: 68
End: 2023-03-31
Payer: MEDICARE

## 2023-03-31 ENCOUNTER — RESULT REVIEW (OUTPATIENT)
Age: 68
End: 2023-03-31

## 2023-03-31 VITALS
DIASTOLIC BLOOD PRESSURE: 85 MMHG | HEIGHT: 65 IN | SYSTOLIC BLOOD PRESSURE: 162 MMHG | HEART RATE: 70 BPM | OXYGEN SATURATION: 100 %

## 2023-03-31 DIAGNOSIS — M54.2 CERVICALGIA: ICD-10-CM

## 2023-03-31 DIAGNOSIS — R29.898 OTHER SYMPTOMS AND SIGNS INVOLVING THE MUSCULOSKELETAL SYSTEM: ICD-10-CM

## 2023-03-31 PROCEDURE — 99203 OFFICE O/P NEW LOW 30 MIN: CPT

## 2023-04-01 PROBLEM — M54.2 NECK PAIN ON LEFT SIDE: Status: ACTIVE | Noted: 2023-04-01

## 2023-04-01 NOTE — PHYSICAL EXAM
[General Appearance - Alert] : alert [General Appearance - In No Acute Distress] : in no acute distress [Oriented To Time, Place, And Person] : oriented to person, place, and time [Person] : oriented to person [Place] : oriented to place [Time] : oriented to time [Short Term Intact] : short term memory intact [Remote Intact] : remote memory intact [Registration Intact] : recent registration memory intact [Span Intact] : the attention span was normal [Concentration Intact] : normal concentrating ability [Visual Intact] : visual attention was ~T not ~L decreased [Fluency] : fluency intact [Comprehension] : comprehension intact [Reading] : reading intact [Current Events] : adequate knowledge of current events [Past History] : adequate knowledge of personal past history [Vocabulary] : adequate range of vocabulary [2+] : Triceps left 2+ [1+] : Ankle jerk left 1+ [Cranial Nerves Facial (VII)] : face symmetrical [Cranial Nerves Vestibulocochlear (VIII)] : hearing was intact bilaterally [Cranial Nerves Glossopharyngeal (IX)] : tongue and palate midline [Cranial Nerves Accessory (XI - Cranial And Spinal)] : head turning and shoulder shrug symmetric [Cranial Nerves Hypoglossal (XII)] : there was no tongue deviation with protrusion [0] : C5 deltoid 0/5 [2] : C5 Biceps 2/5 [4] : C7 triceps 4/5 [5] : S1 toe walking 5/5 [Sensation Tactile Decrease] : light touch was intact [Abnormal Walk] : normal gait [Balance] : balance was intact [No Visual Abnormalities] : no visible abnormalities [Normal] : normal [Able to toe walk] : the patient was able to toe walk [Able to heel walk] : the patient was able to heel walk [___] : absent on the right [___] : absent on the left [Bowman] : Bowman's sign was not demonstrated [de-identified] : right arm 5/5  left \par bilateral  -5/5\par left pushing 5/5 pulling 3/5 [L'Hermitte's] : neck flexion did not produce tingling down the spine/arms [Spurling's - Opposite Side] : Negative Spurling's on opposite side [Spurling's Same Side] : Negative Spurling's on same side

## 2023-04-01 NOTE — ASSESSMENT
[FreeTextEntry1] : 67 year old female with left sided neck, left deltoid pain and weakness for 2 weeks. MRI of cervical spine ordered to assess for disc herniation or stenosis. Cervical flexion-extension x-rays ordered to assess for instability of spine. She will start a course of physical therapy. Patient was provided with a script today. After obtaining images she will return to see Dr. Adrian Dietrich for review.\par  \par

## 2023-04-01 NOTE — REASON FOR VISIT
[New Patient Visit] : a new patient visit [Other: _____] : [unfilled] [FreeTextEntry1] : left sided neck pain, left deltoid pain and weakness.

## 2023-04-01 NOTE — HISTORY OF PRESENT ILLNESS
[< 3 months] : less than 3 months [FreeTextEntry1] : left sided neck pain, left deltoid pain and weakness. [de-identified] :  is 67 year old female here today with HLD, HTN presenting today with left sided neck pain, left deltoid pain and weakness. The patient states on 3/19/2023 she fell asleep on the couch and woke up with left sided neck pain and left deltoid pain and weakness.  Denies any trauma or weakness. Pain at rest is 1/10 and with activity is 3/10. She has received 5 sessions of  chiropractor therapy which has provided moderate relief of her pain. She has used Advil as needed with no relief. No physical therapy, acupuncture or pain management has been initiated. Denies any trouble with her balance, gait instability, numbness or tingling upper of extremities, or RUE symptoms

## 2023-04-03 ENCOUNTER — APPOINTMENT (OUTPATIENT)
Dept: SPINE | Facility: CLINIC | Age: 68
End: 2023-04-03

## 2023-04-04 ENCOUNTER — OUTPATIENT (OUTPATIENT)
Dept: OUTPATIENT SERVICES | Facility: HOSPITAL | Age: 68
LOS: 1 days | End: 2023-04-04
Payer: MEDICARE

## 2023-04-04 ENCOUNTER — APPOINTMENT (OUTPATIENT)
Dept: RADIOLOGY | Facility: HOSPITAL | Age: 68
End: 2023-04-04
Payer: MEDICARE

## 2023-04-04 ENCOUNTER — APPOINTMENT (OUTPATIENT)
Dept: MRI IMAGING | Facility: HOSPITAL | Age: 68
End: 2023-04-04
Payer: MEDICARE

## 2023-04-04 DIAGNOSIS — R29.898 OTHER SYMPTOMS AND SIGNS INVOLVING THE MUSCULOSKELETAL SYSTEM: ICD-10-CM

## 2023-04-04 DIAGNOSIS — M54.12 RADICULOPATHY, CERVICAL REGION: ICD-10-CM

## 2023-04-04 PROCEDURE — 72141 MRI NECK SPINE W/O DYE: CPT | Mod: 26,MH

## 2023-04-04 PROCEDURE — 72052 X-RAY EXAM NECK SPINE 6/>VWS: CPT

## 2023-04-04 PROCEDURE — 72052 X-RAY EXAM NECK SPINE 6/>VWS: CPT | Mod: 26

## 2023-04-04 PROCEDURE — 72141 MRI NECK SPINE W/O DYE: CPT

## 2023-04-05 ENCOUNTER — APPOINTMENT (OUTPATIENT)
Dept: SPINE | Facility: CLINIC | Age: 68
End: 2023-04-05
Payer: MEDICARE

## 2023-04-05 VITALS
DIASTOLIC BLOOD PRESSURE: 102 MMHG | WEIGHT: 258 LBS | SYSTOLIC BLOOD PRESSURE: 164 MMHG | BODY MASS INDEX: 42.99 KG/M2 | HEART RATE: 69 BPM | OXYGEN SATURATION: 99 % | HEIGHT: 65 IN

## 2023-04-05 PROCEDURE — 99213 OFFICE O/P EST LOW 20 MIN: CPT

## 2023-04-05 RX ORDER — ERGOCALCIFEROL 1.25 MG/1
1.25 MG CAPSULE, LIQUID FILLED ORAL
Qty: 4 | Refills: 0 | Status: COMPLETED | COMMUNITY
Start: 2022-09-27

## 2023-04-05 RX ORDER — DOXYCYCLINE HYCLATE 100 MG/1
100 TABLET ORAL
Qty: 42 | Refills: 0 | Status: COMPLETED | COMMUNITY
Start: 2022-11-23

## 2023-04-05 RX ORDER — MULTIVITAMIN
TABLET ORAL
Refills: 0 | Status: ACTIVE | COMMUNITY

## 2023-04-06 ENCOUNTER — TRANSCRIPTION ENCOUNTER (OUTPATIENT)
Age: 68
End: 2023-04-06

## 2023-04-06 ENCOUNTER — INPATIENT (INPATIENT)
Facility: HOSPITAL | Age: 68
LOS: 1 days | Discharge: HOME | DRG: 473 | End: 2023-04-08
Attending: NEUROLOGICAL SURGERY | Admitting: NEUROLOGICAL SURGERY
Payer: MEDICARE

## 2023-04-06 VITALS
DIASTOLIC BLOOD PRESSURE: 74 MMHG | WEIGHT: 240.08 LBS | TEMPERATURE: 98 F | RESPIRATION RATE: 20 BRPM | HEART RATE: 79 BPM | SYSTOLIC BLOOD PRESSURE: 163 MMHG | OXYGEN SATURATION: 97 % | HEIGHT: 65 IN

## 2023-04-06 DIAGNOSIS — M50.20 OTHER CERVICAL DISC DISPLACEMENT, UNSPECIFIED CERVICAL REGION: ICD-10-CM

## 2023-04-06 LAB
ALBUMIN SERPL ELPH-MCNC: 4 G/DL — SIGNIFICANT CHANGE UP (ref 3.3–5)
ALP SERPL-CCNC: 74 U/L — SIGNIFICANT CHANGE UP (ref 40–120)
ALT FLD-CCNC: 12 U/L — SIGNIFICANT CHANGE UP (ref 10–45)
ANION GAP SERPL CALC-SCNC: 13 MMOL/L — SIGNIFICANT CHANGE UP (ref 5–17)
AST SERPL-CCNC: 13 U/L — SIGNIFICANT CHANGE UP (ref 10–40)
BASOPHILS # BLD AUTO: 0.08 K/UL — SIGNIFICANT CHANGE UP (ref 0–0.2)
BASOPHILS NFR BLD AUTO: 1 % — SIGNIFICANT CHANGE UP (ref 0–2)
BILIRUB SERPL-MCNC: 0.2 MG/DL — SIGNIFICANT CHANGE UP (ref 0.2–1.2)
BLD GP AB SCN SERPL QL: NEGATIVE — SIGNIFICANT CHANGE UP
BUN SERPL-MCNC: 23 MG/DL — SIGNIFICANT CHANGE UP (ref 7–23)
CALCIUM SERPL-MCNC: 9 MG/DL — SIGNIFICANT CHANGE UP (ref 8.4–10.5)
CHLORIDE SERPL-SCNC: 105 MMOL/L — SIGNIFICANT CHANGE UP (ref 96–108)
CO2 SERPL-SCNC: 26 MMOL/L — SIGNIFICANT CHANGE UP (ref 22–31)
CREAT SERPL-MCNC: 0.85 MG/DL — SIGNIFICANT CHANGE UP (ref 0.5–1.3)
EGFR: 76 ML/MIN/1.73M2 — SIGNIFICANT CHANGE UP
EOSINOPHIL # BLD AUTO: 0.24 K/UL — SIGNIFICANT CHANGE UP (ref 0–0.5)
EOSINOPHIL NFR BLD AUTO: 2.9 % — SIGNIFICANT CHANGE UP (ref 0–6)
GLUCOSE SERPL-MCNC: 121 MG/DL — HIGH (ref 70–99)
HCT VFR BLD CALC: 38.6 % — SIGNIFICANT CHANGE UP (ref 34.5–45)
HGB BLD-MCNC: 12.3 G/DL — SIGNIFICANT CHANGE UP (ref 11.5–15.5)
IMM GRANULOCYTES NFR BLD AUTO: 0.1 % — SIGNIFICANT CHANGE UP (ref 0–0.9)
INR BLD: 1.03 RATIO — SIGNIFICANT CHANGE UP (ref 0.88–1.16)
LYMPHOCYTES # BLD AUTO: 2.09 K/UL — SIGNIFICANT CHANGE UP (ref 1–3.3)
LYMPHOCYTES # BLD AUTO: 25.2 % — SIGNIFICANT CHANGE UP (ref 13–44)
MCHC RBC-ENTMCNC: 29.6 PG — SIGNIFICANT CHANGE UP (ref 27–34)
MCHC RBC-ENTMCNC: 31.9 GM/DL — LOW (ref 32–36)
MCV RBC AUTO: 93 FL — SIGNIFICANT CHANGE UP (ref 80–100)
MONOCYTES # BLD AUTO: 0.67 K/UL — SIGNIFICANT CHANGE UP (ref 0–0.9)
MONOCYTES NFR BLD AUTO: 8.1 % — SIGNIFICANT CHANGE UP (ref 2–14)
NEUTROPHILS # BLD AUTO: 5.22 K/UL — SIGNIFICANT CHANGE UP (ref 1.8–7.4)
NEUTROPHILS NFR BLD AUTO: 62.7 % — SIGNIFICANT CHANGE UP (ref 43–77)
NRBC # BLD: 0 /100 WBCS — SIGNIFICANT CHANGE UP (ref 0–0)
PLATELET # BLD AUTO: 294 K/UL — SIGNIFICANT CHANGE UP (ref 150–400)
POTASSIUM SERPL-MCNC: 3.6 MMOL/L — SIGNIFICANT CHANGE UP (ref 3.5–5.3)
POTASSIUM SERPL-SCNC: 3.6 MMOL/L — SIGNIFICANT CHANGE UP (ref 3.5–5.3)
PROT SERPL-MCNC: 6.8 G/DL — SIGNIFICANT CHANGE UP (ref 6–8.3)
PROTHROM AB SERPL-ACNC: 11.9 SEC — SIGNIFICANT CHANGE UP (ref 10.5–13.4)
RBC # BLD: 4.15 M/UL — SIGNIFICANT CHANGE UP (ref 3.8–5.2)
RBC # FLD: 13.7 % — SIGNIFICANT CHANGE UP (ref 10.3–14.5)
RH IG SCN BLD-IMP: POSITIVE — SIGNIFICANT CHANGE UP
SODIUM SERPL-SCNC: 144 MMOL/L — SIGNIFICANT CHANGE UP (ref 135–145)
WBC # BLD: 8.31 K/UL — SIGNIFICANT CHANGE UP (ref 3.8–10.5)
WBC # FLD AUTO: 8.31 K/UL — SIGNIFICANT CHANGE UP (ref 3.8–10.5)

## 2023-04-06 PROCEDURE — 22853 INSJ BIOMECHANICAL DEVICE: CPT

## 2023-04-06 PROCEDURE — 22551 ARTHRD ANT NTRBDY CERVICAL: CPT

## 2023-04-06 PROCEDURE — 99285 EMERGENCY DEPT VISIT HI MDM: CPT | Mod: GC

## 2023-04-06 RX ORDER — TERBINAFINE HCL 250 MG
1 TABLET ORAL
Refills: 0 | DISCHARGE

## 2023-04-06 RX ORDER — OXYCODONE HYDROCHLORIDE 5 MG/1
5 TABLET ORAL EVERY 4 HOURS
Refills: 0 | Status: DISCONTINUED | OUTPATIENT
Start: 2023-04-06 | End: 2023-04-07

## 2023-04-06 RX ORDER — SODIUM CHLORIDE 9 MG/ML
1000 INJECTION INTRAMUSCULAR; INTRAVENOUS; SUBCUTANEOUS
Refills: 0 | Status: DISCONTINUED | OUTPATIENT
Start: 2023-04-06 | End: 2023-04-07

## 2023-04-06 RX ORDER — METFORMIN HYDROCHLORIDE 850 MG/1
1 TABLET ORAL
Qty: 0 | Refills: 0 | DISCHARGE

## 2023-04-06 RX ORDER — OXYCODONE HYDROCHLORIDE 5 MG/1
10 TABLET ORAL EVERY 4 HOURS
Refills: 0 | Status: DISCONTINUED | OUTPATIENT
Start: 2023-04-06 | End: 2023-04-07

## 2023-04-06 RX ORDER — ACETAMINOPHEN 500 MG
650 TABLET ORAL EVERY 6 HOURS
Refills: 0 | Status: DISCONTINUED | OUTPATIENT
Start: 2023-04-06 | End: 2023-04-07

## 2023-04-06 RX ORDER — SENNA PLUS 8.6 MG/1
2 TABLET ORAL AT BEDTIME
Refills: 0 | Status: DISCONTINUED | OUTPATIENT
Start: 2023-04-06 | End: 2023-04-07

## 2023-04-06 RX ORDER — PANTOPRAZOLE SODIUM 20 MG/1
40 TABLET, DELAYED RELEASE ORAL
Refills: 0 | Status: DISCONTINUED | OUTPATIENT
Start: 2023-04-06 | End: 2023-04-07

## 2023-04-06 RX ORDER — ONDANSETRON 8 MG/1
4 TABLET, FILM COATED ORAL EVERY 6 HOURS
Refills: 0 | Status: DISCONTINUED | OUTPATIENT
Start: 2023-04-06 | End: 2023-04-07

## 2023-04-06 RX ORDER — CALCIUM CARBONATE 500(1250)
1 TABLET ORAL EVERY 4 HOURS
Refills: 0 | Status: DISCONTINUED | OUTPATIENT
Start: 2023-04-06 | End: 2023-04-07

## 2023-04-06 NOTE — ED ADULT NURSE NOTE - OBJECTIVE STATEMENT
68yo F denies pmh, presents to ED from home sent in for admission by Dr. Dietrich to have neck surgery tomorrow. pt reports new onset of neck pain and weakness in L arm 2 weeks ago after sleeping weird on her neck, pt went to chiropractor with no improvement in symptoms. pt then had an MRI this week which shows herniated c4/c5 discs and nerve impingement. pt endorses the pain is intermittent but the arm weakness is constant and she can not lift her L arm. pt denies any numbness, tingling, HA, dizziness, vision changes, cp, sob, fevers, or other medical complaints. on exam in ED pt is generally well appearing in NAD. pt is ambulatory independently. in ED pt is awake and alert, a&ox4, breathing even and unlabored, vital signs stable, extremities nonedematous, sensation intact b/l to upper/lower extremities, decreased strength noted to LUE on flexion and abduction, 5/5  strength to upper extremities b/l, moving lower extremities with 5/5 strength b/l. NAD noted at this time. pt seen and eval by ED MD. IV placed to RAC 20g, labs drawn and sent per orders. pt resting in stretcher with bed locked in lowest position, side rails up for safety. pending results and dispo.

## 2023-04-06 NOTE — ED ADULT NURSE NOTE - NSIMPLEMENTINTERV_GEN_ALL_ED
Implemented All Universal Safety Interventions:  [place] to call system. Call bell, personal items and telephone within reach. Instruct patient to call for assistance. Room bathroom lighting operational. Non-slip footwear when patient is off stretcher. Physically safe environment: no spills, clutter or unnecessary equipment. Stretcher in lowest position, wheels locked, appropriate side rails in place.

## 2023-04-06 NOTE — ED PROVIDER NOTE - PHYSICAL EXAMINATION
Constitutional: VS reviewed. Alert and orientedx3, well appearing, no apparent distress  HEENT: Atraumatic, EOMI  CV: RRR  Lungs: Clear and equal bilaterally, no wheezes, rales or crackles  Abdomen: Soft, nondistended, nontender  MSK: No deformities. TTP of lateral left neck.   Skin: Warm and dry. As visualized no rashes, lesions, bruising or erythema  Neuro: Strength severely diminished in left UE. Strength 2/5 in flexion and abduction of LE. 5/5  strength in b/l UE. 5/5 strength in b/l lower extremities. Sensation intact.   Lymph: No pitting edema in extremities.

## 2023-04-06 NOTE — ED PROVIDER NOTE - OBJECTIVE STATEMENT
68 y/o F with no known PMHx presents to the ED for admission for neck surgery tomorrow. Pt sent to ED by Dr. Dietrich for admission for neck surgery for C4/5 herniated disc. Pt states she had neck pain that started about 2 weeks ago after sleeping on neck weird. Pt states she went to chiropractor multiple times after pain with no improvement in symptoms. Pt also c/o of severe weakness in the left arm since the pain started. Pt seen by Dr. Dietrich earlier this week and had MRI showing c4/5 herniated disc with severe nerve impingement. Pt denies fevers, chills, CP, SOB, abdominal pain, n/v/d/c, dysuria, hematuria.

## 2023-04-06 NOTE — ED PROVIDER NOTE - CLINICAL SUMMARY MEDICAL DECISION MAKING FREE TEXT BOX
68 y/o F with no known PMHx presents to the ED for admission for neck surgery tomorrow. Pt sent to ED by Dr. Dietrich for admission for neck surgery for C4/5 herniated disc. Significant weakness of LUE. Will talk to neurosurg team. Dispo admission with pre-ops. 66 y/o F with no known PMHx presents to the ED for admission for neck surgery tomorrow. Pt sent to ED by Dr. Dietrich for admission for neck surgery for C4/5 herniated disc. Significant weakness of LUE. Will talk to neurosurg team. Dispo admission with pre-ops.  Pau: 67 year old female with no pmhx here for surgery for c4/c5 herniated disc.  will get labs, pre-op, nsx consult admit.

## 2023-04-07 ENCOUNTER — APPOINTMENT (OUTPATIENT)
Dept: SPINE | Facility: HOSPITAL | Age: 68
End: 2023-04-07
Payer: MEDICARE

## 2023-04-07 ENCOUNTER — TRANSCRIPTION ENCOUNTER (OUTPATIENT)
Age: 68
End: 2023-04-07

## 2023-04-07 DIAGNOSIS — M54.12 RADICULOPATHY, CERVICAL REGION: ICD-10-CM

## 2023-04-07 DIAGNOSIS — Z01.818 ENCOUNTER FOR OTHER PREPROCEDURAL EXAMINATION: ICD-10-CM

## 2023-04-07 DIAGNOSIS — Z29.9 ENCOUNTER FOR PROPHYLACTIC MEASURES, UNSPECIFIED: ICD-10-CM

## 2023-04-07 DIAGNOSIS — R73.03 PREDIABETES: ICD-10-CM

## 2023-04-07 LAB
GLUCOSE BLDC GLUCOMTR-MCNC: 124 MG/DL — HIGH (ref 70–99)
MRSA PCR RESULT.: SIGNIFICANT CHANGE UP
S AUREUS DNA NOSE QL NAA+PROBE: SIGNIFICANT CHANGE UP
SARS-COV-2 RNA SPEC QL NAA+PROBE: SIGNIFICANT CHANGE UP

## 2023-04-07 PROCEDURE — 22551 ARTHRD ANT NTRBDY CERVICAL: CPT | Mod: AS,78

## 2023-04-07 PROCEDURE — 22853 INSJ BIOMECHANICAL DEVICE: CPT | Mod: AS

## 2023-04-07 PROCEDURE — 99223 1ST HOSP IP/OBS HIGH 75: CPT | Mod: GC

## 2023-04-07 DEVICE — LIGATING CLIPS WECK HEMOCLIP TITANIUM SMALL (YELLOW) 25: Type: IMPLANTABLE DEVICE | Status: FUNCTIONAL

## 2023-04-07 DEVICE — PUTTY BIOACTIVE KINEX PLUS 2CC: Type: IMPLANTABLE DEVICE | Status: FUNCTIONAL

## 2023-04-07 DEVICE — SPACER COALITION MIS 7 DEG 14X16X7MM: Type: IMPLANTABLE DEVICE | Status: FUNCTIONAL

## 2023-04-07 DEVICE — ANCHOR COALITION MIS 12MM: Type: IMPLANTABLE DEVICE | Status: FUNCTIONAL

## 2023-04-07 DEVICE — LIGATING CLIPS WECK HEMOCLIP TITANIUM MEDIUM (BLUE) 25: Type: IMPLANTABLE DEVICE | Status: FUNCTIONAL

## 2023-04-07 DEVICE — DISTRACTION PIN 14MM (YELLOW): Type: IMPLANTABLE DEVICE | Status: FUNCTIONAL

## 2023-04-07 DEVICE — KIT A-LINE 1LUM 20G X 12CM SAFE KIT: Type: IMPLANTABLE DEVICE | Status: FUNCTIONAL

## 2023-04-07 DEVICE — SURGIFLO MATRIX WITH THROMBIN KIT: Type: IMPLANTABLE DEVICE | Status: FUNCTIONAL

## 2023-04-07 DEVICE — SURGIFOAM PAD 8CM X 12.5CM X 10MM (100): Type: IMPLANTABLE DEVICE | Status: FUNCTIONAL

## 2023-04-07 RX ORDER — CALCIUM CARBONATE 500(1250)
1 TABLET ORAL EVERY 4 HOURS
Refills: 0 | Status: DISCONTINUED | OUTPATIENT
Start: 2023-04-07 | End: 2023-04-08

## 2023-04-07 RX ORDER — ONDANSETRON 8 MG/1
8 TABLET, FILM COATED ORAL ONCE
Refills: 0 | Status: DISCONTINUED | OUTPATIENT
Start: 2023-04-07 | End: 2023-04-07

## 2023-04-07 RX ORDER — DEXAMETHASONE 0.5 MG/5ML
4 ELIXIR ORAL EVERY 6 HOURS
Refills: 0 | Status: COMPLETED | OUTPATIENT
Start: 2023-04-07 | End: 2023-04-08

## 2023-04-07 RX ORDER — ACETAMINOPHEN 500 MG
650 TABLET ORAL EVERY 6 HOURS
Refills: 0 | Status: DISCONTINUED | OUTPATIENT
Start: 2023-04-07 | End: 2023-04-08

## 2023-04-07 RX ORDER — HYDRALAZINE HCL 50 MG
5 TABLET ORAL ONCE
Refills: 0 | Status: DISCONTINUED | OUTPATIENT
Start: 2023-04-07 | End: 2023-04-08

## 2023-04-07 RX ORDER — ENOXAPARIN SODIUM 100 MG/ML
40 INJECTION SUBCUTANEOUS EVERY 24 HOURS
Refills: 0 | Status: DISCONTINUED | OUTPATIENT
Start: 2023-04-07 | End: 2023-04-07

## 2023-04-07 RX ORDER — PANTOPRAZOLE SODIUM 20 MG/1
40 TABLET, DELAYED RELEASE ORAL
Refills: 0 | Status: DISCONTINUED | OUTPATIENT
Start: 2023-04-07 | End: 2023-04-08

## 2023-04-07 RX ORDER — HYDROMORPHONE HYDROCHLORIDE 2 MG/ML
1 INJECTION INTRAMUSCULAR; INTRAVENOUS; SUBCUTANEOUS
Refills: 0 | Status: DISCONTINUED | OUTPATIENT
Start: 2023-04-07 | End: 2023-04-07

## 2023-04-07 RX ORDER — INSULIN LISPRO 100/ML
VIAL (ML) SUBCUTANEOUS
Refills: 0 | Status: DISCONTINUED | OUTPATIENT
Start: 2023-04-07 | End: 2023-04-08

## 2023-04-07 RX ORDER — OXYCODONE HYDROCHLORIDE 5 MG/1
5 TABLET ORAL EVERY 4 HOURS
Refills: 0 | Status: DISCONTINUED | OUTPATIENT
Start: 2023-04-07 | End: 2023-04-08

## 2023-04-07 RX ORDER — SODIUM CHLORIDE 9 MG/ML
1000 INJECTION INTRAMUSCULAR; INTRAVENOUS; SUBCUTANEOUS
Refills: 0 | Status: DISCONTINUED | OUTPATIENT
Start: 2023-04-07 | End: 2023-04-07

## 2023-04-07 RX ORDER — CEFAZOLIN SODIUM 1 G
2000 VIAL (EA) INJECTION EVERY 8 HOURS
Refills: 0 | Status: COMPLETED | OUTPATIENT
Start: 2023-04-07 | End: 2023-04-08

## 2023-04-07 RX ORDER — SENNA PLUS 8.6 MG/1
2 TABLET ORAL AT BEDTIME
Refills: 0 | Status: DISCONTINUED | OUTPATIENT
Start: 2023-04-07 | End: 2023-04-08

## 2023-04-07 RX ORDER — ENOXAPARIN SODIUM 100 MG/ML
40 INJECTION SUBCUTANEOUS EVERY 24 HOURS
Refills: 0 | Status: DISCONTINUED | OUTPATIENT
Start: 2023-04-08 | End: 2023-04-08

## 2023-04-07 RX ORDER — POLYETHYLENE GLYCOL 3350 17 G/17G
17 POWDER, FOR SOLUTION ORAL
Refills: 0 | Status: DISCONTINUED | OUTPATIENT
Start: 2023-04-07 | End: 2023-04-08

## 2023-04-07 RX ORDER — OXYCODONE HYDROCHLORIDE 5 MG/1
10 TABLET ORAL EVERY 4 HOURS
Refills: 0 | Status: DISCONTINUED | OUTPATIENT
Start: 2023-04-07 | End: 2023-04-08

## 2023-04-07 RX ORDER — HYDROMORPHONE HYDROCHLORIDE 2 MG/ML
0.5 INJECTION INTRAMUSCULAR; INTRAVENOUS; SUBCUTANEOUS
Refills: 0 | Status: DISCONTINUED | OUTPATIENT
Start: 2023-04-07 | End: 2023-04-07

## 2023-04-07 RX ORDER — METOPROLOL TARTRATE 50 MG
5 TABLET ORAL ONCE
Refills: 0 | Status: DISCONTINUED | OUTPATIENT
Start: 2023-04-07 | End: 2023-04-07

## 2023-04-07 RX ADMIN — SODIUM CHLORIDE 100 MILLILITER(S): 9 INJECTION INTRAMUSCULAR; INTRAVENOUS; SUBCUTANEOUS at 16:34

## 2023-04-07 RX ADMIN — Medication 100 MILLIGRAM(S): at 20:45

## 2023-04-07 RX ADMIN — SENNA PLUS 2 TABLET(S): 8.6 TABLET ORAL at 22:11

## 2023-04-07 RX ADMIN — Medication 4 MILLIGRAM(S): at 18:36

## 2023-04-07 NOTE — H&P ADULT - ASSESSMENT
67F p/w Left C5 radiculopathy x3wks. With significant Left deltoid weakness and paresthesias. MRI shows degenerative changes and C4-5 disc bulge. Exam: AOx3, Left Delt 2/5, otherwise full strength. No Bowman's or clonus  -Adm 7Tower  -Preop for C4-5 ACDF

## 2023-04-07 NOTE — PROGRESS NOTE ADULT - ASSESSMENT
Patient seen and examined at bedside s/p C4-5 anterior cervical discectomy and fusion, recovering well.   -PACU Floor, cleared after 6 hours.  -Q4 neurochecks  -Q4 vitals  -Pain control  -Advance diet as tolerated  -PT/OT

## 2023-04-07 NOTE — CHART NOTE - NSCHARTNOTEFT_GEN_A_CORE
CAPRINI SCORE [CLOT] Score on Admission for     AGE RELATED RISK FACTORS                                                       MOBILITY RELATED FACTORS  [ ] Age 41-60 years                                            (1 Point)                  [ ] Bed rest                                                        (1 Point)  [x ] Age: 61-74 years                                           (2 Points)                 [ ] Plaster cast                                                   (2 Points)  [ ] Age= 75 years                                              (3 Points)                 [ ] Bed bound for more than 72 hours                 (2 Points)    DISEASE RELATED RISK FACTORS                                               GENDER SPECIFIC FACTORS  [ ] Edema in the lower extremities                       (1 Point)                  [ ] Pregnancy                                                     (1 Point)  [ ] Varicose veins                                               (1 Point)                  [ ] Post-partum < 6 weeks                                   (1 Point)             [ x] BMI > 25 Kg/m2                                            (1 Point)                  [ ] Hormonal therapy  or oral contraception          (1 Point)                 [ ] Sepsis (in the previous month)                        (1 Point)                  [ ] History of pregnancy complications                 (1 point)  [ ] Pneumonia or serious lung disease                                               [ ] Unexplained or recurrent                     (1 Point)           (in the previous month)                               (1 Point)  [ ] Abnormal pulmonary function test                     (1 Point)                 SURGERY RELATED RISK FACTORS (include planned surgeries)  [ ] Acute myocardial infarction                              (1 Point)                 [ ]  Section                                             (1 Point)  [ ] Congestive heart failure (in the previous month)  (1 Point)               [ ] Minor surgery                                                  (1 Point)   [ ] Inflammatory bowel disease                             (1 Point)                 [ ] Arthroscopic surgery                                        (2 Points)  [ ] Central venous access                                      (2 Points)                [x ] General surgery lasting more than 45 minutes   (2 Points)       [ ] Stroke (in the previous month)                          (5 Points)               [ ] Elective arthroplasty                                         (5 Points)            [ ] Current or past malignancy                                (2 Points)                                                                                                     HEMATOLOGY RELATED FACTORS                                                 TRAUMA RELATED RISK FACTORS  [ ] Prior episodes of VTE                                     (3 Points)                [ ] Fracture of the hip, pelvis, or leg                       (5 Points)  [ ] Positive family history for VTE                         (3 Points)                 [ ] Acute spinal cord injury (in the previous month)  (5 Points)  [ ] Prothrombin 67818 A                                     (3 Points)                 [ ] Paralysis  (less than 1 month)                             (5 Points)  [ ] Factor V Leiden                                             (3 Points)                  [ ] Multiple Trauma within 1 month                        (5 Points)  [ ] Lupus anticoagulants                                     (3 Points)                                                           [ ] Anticardiolipin antibodies                               (3 Points)                                                       [ ] High homocysteine in the blood                      (3 Points)                                             [ ] Other congenital or acquired thrombophilia      (3 Points)                                                [ ] Heparin induced thrombocytopenia                  (3 Points)                                          Total Score [    5      ]    Risk:  Very low 0   Low 1 to 2   Moderate 3 to 4   High =5       VTE Prophylasix Recommednations:  [x ] mechanical pneumatic compression devices                                      [ ] contraindicated: _____________________  [ ] chemo prophylasix                                                                                   [ ] contraindicated _____________________    **** HIGH LIKELIHOOD DVT PRESENT ON ADMISSION  [x ] (please order LE dopplers within 24 hours of admission)

## 2023-04-07 NOTE — BRIEF OPERATIVE NOTE - NSICDXBRIEFPROCEDURE_GEN_ALL_CORE_FT
PROCEDURES:  Anterior cervical discectomy and fusion (ACDF) at single level 07-Apr-2023 14:53:54  Bhavesh Jones

## 2023-04-07 NOTE — PHYSICAL THERAPY INITIAL EVALUATION ADULT - ACTIVE RANGE OF MOTION EXAMINATION, REHAB EVAL
except L shoulder AROM ~ 70 degrees/bilateral upper extremity Active ROM was WFL (within functional limits)/bilateral  lower extremity Active ROM was WFL (within functional limits)

## 2023-04-07 NOTE — CONSULT NOTE ADULT - ATTENDING COMMENTS
67F p/w Left C5 radiculopathy w/significant left deltoid weakness and paresthesias, MRI shows degenerative changes and C4-5 disc bulge. Pt admitted for C4-C5 ACDF, medicine consulted for preoperative medical optimization.     # Cervical Radiculopathy   - P/w Left C5 radiculopathy x3wks w/significant Left deltoid weakness and paresthesia.    - MRI showed degenerative changes and C4-5 disc bulge   - Planned for C4-C5 ACDF by neurosurg, further management per neurosurg      # PreDM   - Hx/o Pre-DM, was previously on Metformin, now stopped per PMD no longer needed    - Check A1c   - FS q6 while NPO>>> ACHS when resumes diet      # Per-Op eval    - No prior reaction to anesthesia   - Non smoker    - Able to perform > 4mets    - RCRI: 0   - On chart review has prior EKG that’s NSR. Echo 6/2021 with EF 77%   - cbc & chem largely unremarkable    - COVID neg    - Active T&S   - Pt is low risk for low-risk procedure and is medically optimized for surgery

## 2023-04-07 NOTE — PHYSICAL THERAPY INITIAL EVALUATION ADULT - GENERAL OBSERVATIONS, REHAB EVAL
rectal abscess Pt rec'd semi-supine in bed NAD, +UE IV, +delfina PAS, +hmvc, PACU monitoring, +pulse ox.

## 2023-04-07 NOTE — PROGRESS NOTE ADULT - SUBJECTIVE AND OBJECTIVE BOX
Patient seen and examined s/p C4-5 anterior cervical discectomy and fusion.     Left C5 radicular pain improved. Left Delt still 2-3/5. Otherwise AOx3, BUTLER 5/5.    T(C): 36 (04-07-23 @ 14:45), Max: 36.8 (04-07-23 @ 01:15)  HR: 85 (04-07-23 @ 19:00) (64 - 85)  BP: 148/81 (04-07-23 @ 18:00) (132/68 - 179/101)  RR: 18 (04-07-23 @ 19:00) (16 - 20)  SpO2: 99% (04-07-23 @ 19:00) (96% - 100%)                          12.3   8.31  )-----------( 294      ( 06 Apr 2023 22:47 )             38.6     04-06    144  |  105  |  23  ----------------------------<  121<H>  3.6   |  26  |  0.85    Ca    9.0      06 Apr 2023 22:47    TPro  6.8  /  Alb  4.0  /  TBili  0.2  /  DBili  x   /  AST  13  /  ALT  12  /  AlkPhos  74  04-06    PT/INR - ( 06 Apr 2023 22:47 )   PT: 11.9 sec;   INR: 1.03 ratio         PTT - ( 06 Apr 2023 22:47 )  PTT:30.8 sec        CAPILLARY BLOOD GLUCOSE      POCT Blood Glucose.: 124 mg/dL (07 Apr 2023 16:47)

## 2023-04-07 NOTE — CONSULT NOTE ADULT - PROBLEM SELECTOR RECOMMENDATION 4
-Pt has no PMH of cardiac or pulmonary disease. METs >7  -RCRI score 0 points, which constitutes a 3.9% 30-day risk of death, MI, or cardiac arrest  -Pt is medically optimized and is at low risk for a low risk cervical neck surgery

## 2023-04-07 NOTE — CONSULT NOTE ADULT - PROBLEM SELECTOR RECOMMENDATION 2
Pt with PMH of prediabetes, previously on metformin last year, which she has since stopped because her PMD told her it was no longer required.   -Would obtain A1C in AM and monitor blood glucose Pt with PMH of prediabetes, previously on metformin last year, which she has since stopped because her PMD told her it was no longer required.   -Would obtain A1C in AM and monitor blood glucose Q6 or premeals/before bedtime

## 2023-04-07 NOTE — PRE-ANESTHESIA EVALUATION ADULT - BP NONINVASIVE DIASTOLIC (MM HG)
From [facility] reported to have vomitted bright red blood but had been having black tarry stool, also c/o right sided abdominal pain, hypotensive upon arrival in ER but coherent   88

## 2023-04-07 NOTE — PHYSICAL THERAPY INITIAL EVALUATION ADULT - PERTINENT HX OF CURRENT PROBLEM, REHAB EVAL
67F p/w Left C5 radiculopathy x3wks. With significant Left deltoid weakness and paresthesias. MRI shows degenerative changes and C4-5 disc bulge. Exam: AOx3, Left Delt 2/5, otherwise full strength. No Bowman's or clonus.

## 2023-04-07 NOTE — CONSULT NOTE ADULT - PROBLEM SELECTOR RECOMMENDATION 9
Left C5 radiculopathy  -MRI shows degenerative changes and C4-5 disc bulge  -Pt scheduled for cervical neck surgery in AM

## 2023-04-07 NOTE — PHYSICAL THERAPY INITIAL EVALUATION ADULT - ADDITIONAL COMMENTS
Pt states that she lives in a pvt home with her  +1step to enter and +1 flight once inside +handrail. Pt reports that she was independent with all functional mobility and ADLs prior to admission. Pt states that she does not own any DME.

## 2023-04-07 NOTE — H&P ADULT - HISTORY OF PRESENT ILLNESS
67F p/w Left C5 radiculopathy x3wks. With significant Left deltoid weakness and paresthesias. MRI shows degenerative changes and C4-5 disc bulge. Exam: AOx3, Left Delt 2/5, otherwise full strength. No Bowman's or clonus

## 2023-04-07 NOTE — OCCUPATIONAL THERAPY INITIAL EVALUATION ADULT - PERTINENT HX OF CURRENT PROBLEM, REHAB EVAL
67F p/w Left C5 radiculopathy presenting with significant left deltoid weakness and paresthesias, MRI shows degenerative changes and C4-5 disc bulge. Pt admitted for cervical neck surgery. Medicine consulted for preoperative medical optimization.     XRAY CERVICAL SPINE: Spondylosis as above.Mild instability with anterolisthesis at C5-6. 67F p/w Left C5 radiculopathy presenting with significant left deltoid weakness and paresthesias, MRI shows degenerative changes and C4-5 disc bulge. Pt admitted for cervical neck surgery. Medicine consulted for preoperative medical optimization. Pt now s/p Anterior cervical discectomy and fusion (ACDF)  C4-5    XRAY CERVICAL SPINE: Spondylosis as above.Mild instability with anterolisthesis at C5-6.

## 2023-04-07 NOTE — CONSULT NOTE ADULT - SUBJECTIVE AND OBJECTIVE BOX
CHIEF COMPLAINT: Left arm weakness, numbness    HPI:  67F p/w Left C5 radiculopathy x3wks. With significant Left deltoid weakness and paresthesias. MRI shows degenerative changes and C4-5 disc bulge. Exam: AOx3, Left Delt 2/5, otherwise full strength. 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 No nausea, vomiting, or hematemesis; No diarrhea or constipation. No melena or hematochezia.  GENITOURINARY: No dysuria, frequency or hematuria  NEUROLOGICAL: No numbness or weakness  SKIN: No itching, rashes    OBJECTIVE:  ICU Vital Signs Last 24 Hrs  T(C): 36.8 (07 Apr 2023 01:15), Max: 36.8 (07 Apr 2023 01:15)  T(F): 98.3 (07 Apr 2023 01:15), Max: 98.3 (07 Apr 2023 01:15)  HR: 69 (07 Apr 2023 01:15) (69 - 79)  BP: 163/92 (07 Apr 2023 01:15) (157/91 - 163/92)  BP(mean): 113 (07 Apr 2023 00:09) (113 - 113)  RR: 18 (07 Apr 2023 01:15) (18 - 20)  SpO2: 97% (07 Apr 2023 01:15) (97% - 98%)    O2 Parameters below as of 07 Apr 2023 01:15  Patient On (Oxygen Delivery Method): room air    CAPILLARY BLOOD GLUCOSE    GENERAL: NAD, lying in bed comfortably  HEAD:  Atraumatic, Normocephalic  EYES: EOMI, PERRLA, conjunctiva and sclera clear  ENT: Moist mucous membranes  NECK: Supple, No JVD  CHEST/LUNG: Clear to auscultation bilaterally; No rales, rhonchi, wheezing, or rubs. Unlabored respirations  HEART: Regular rate and rhythm; No murmurs, rubs, or gallops  ABDOMEN: Bowel sounds present; Soft, Nontender, Nondistended.  EXTREMITIES:  2+ Peripheral Pulses, brisk capillary refill. No clubbing, cyanosis, or edema  NERVOUS SYSTEM:  Alert & Oriented X3, speech clear. No deficits   MSK: FROM all 4 extremities, full and equal strength  SKIN: No rashes or lesions    HOSPITAL MEDICATIONS:  MEDICATIONS  (STANDING):  pantoprazole    Tablet 40 milliGRAM(s) Oral before breakfast  senna 2 Tablet(s) Oral at bedtime  sodium chloride 0.9%. 1000 milliLiter(s) (70 mL/Hr) IV Continuous <Continuous>    MEDICATIONS  (PRN):  acetaminophen     Tablet .. 650 milliGRAM(s) Oral every 6 hours PRN Temp greater or equal to 38C (100.4F), Mild Pain (1 - 3)  calcium carbonate    500 mG (Tums) Chewable 1 Tablet(s) Chew every 4 hours PRN Heartburn  ondansetron Injectable 4 milliGRAM(s) IV Push every 6 hours PRN Nausea and/or Vomiting  oxyCODONE    IR 5 milliGRAM(s) Oral every 4 hours PRN Moderate Pain (4 - 6)  oxyCODONE    IR 10 milliGRAM(s) Oral every 4 hours PRN Severe Pain (7 - 10)      LABS:                        12.3   8.31  )-----------( 294      ( 06 Apr 2023 22:47 )             38.6     04-06    144  |  105  |  23  ----------------------------<  121<H>  3.6   |  26  |  0.85    Ca    9.0      06 Apr 2023 22:47    TPro  6.8  /  Alb  4.0  /  TBili  0.2  /  DBili  x   /  AST  13  /  ALT  12  /  AlkPhos  74  04-06    PT/INR - ( 06 Apr 2023 22:47 )   PT: 11.9 sec;   INR: 1.03 ratio         PTT - ( 06 Apr 2023 22:47 )  PTT:30.8 sec    MICROBIOLOGY:     RADIOLOGY:  [ ] Reviewed and interpreted by me    EKG: not performed

## 2023-04-07 NOTE — CONSULT NOTE ADULT - ASSESSMENT
67F p/w Left C5 radiculopathy presenting with significant left deltoid weakness and paresthesias, MRI shows degenerative changes and C4-5 disc bulge. Pt admitted for cervical neck surgery. Medicine consulted for preoperative medical optimization.

## 2023-04-07 NOTE — PHYSICAL THERAPY INITIAL EVALUATION ADULT - PLANNED THERAPY INTERVENTIONS, PT EVAL
stair training: GOAL: Pt will negotiate up/down 1 flight with 1 handrail ascending independently in 2 weeks./gait training

## 2023-04-08 ENCOUNTER — TRANSCRIPTION ENCOUNTER (OUTPATIENT)
Age: 68
End: 2023-04-08

## 2023-04-08 VITALS — SYSTOLIC BLOOD PRESSURE: 158 MMHG | HEART RATE: 85 BPM | DIASTOLIC BLOOD PRESSURE: 81 MMHG

## 2023-04-08 DIAGNOSIS — R03.0 ELEVATED BLOOD-PRESSURE READING, WITHOUT DIAGNOSIS OF HYPERTENSION: ICD-10-CM

## 2023-04-08 LAB
A1C WITH ESTIMATED AVERAGE GLUCOSE RESULT: 5.7 % — HIGH (ref 4–5.6)
ANION GAP SERPL CALC-SCNC: 13 MMOL/L — SIGNIFICANT CHANGE UP (ref 5–17)
BASOPHILS # BLD AUTO: 0.01 K/UL — SIGNIFICANT CHANGE UP (ref 0–0.2)
BASOPHILS NFR BLD AUTO: 0.1 % — SIGNIFICANT CHANGE UP (ref 0–2)
BUN SERPL-MCNC: 16 MG/DL — SIGNIFICANT CHANGE UP (ref 7–23)
CALCIUM SERPL-MCNC: 9.2 MG/DL — SIGNIFICANT CHANGE UP (ref 8.4–10.5)
CHLORIDE SERPL-SCNC: 103 MMOL/L — SIGNIFICANT CHANGE UP (ref 96–108)
CO2 SERPL-SCNC: 25 MMOL/L — SIGNIFICANT CHANGE UP (ref 22–31)
CREAT SERPL-MCNC: 0.63 MG/DL — SIGNIFICANT CHANGE UP (ref 0.5–1.3)
EGFR: 98 ML/MIN/1.73M2 — SIGNIFICANT CHANGE UP
EOSINOPHIL # BLD AUTO: 0 K/UL — SIGNIFICANT CHANGE UP (ref 0–0.5)
EOSINOPHIL NFR BLD AUTO: 0 % — SIGNIFICANT CHANGE UP (ref 0–6)
ESTIMATED AVERAGE GLUCOSE: 117 MG/DL — HIGH (ref 68–114)
GLUCOSE BLDC GLUCOMTR-MCNC: 130 MG/DL — HIGH (ref 70–99)
GLUCOSE BLDC GLUCOMTR-MCNC: 221 MG/DL — HIGH (ref 70–99)
GLUCOSE SERPL-MCNC: 166 MG/DL — HIGH (ref 70–99)
HCT VFR BLD CALC: 40.3 % — SIGNIFICANT CHANGE UP (ref 34.5–45)
HCV AB S/CO SERPL IA: 0.09 S/CO — SIGNIFICANT CHANGE UP (ref 0–0.99)
HCV AB SERPL-IMP: SIGNIFICANT CHANGE UP
HGB BLD-MCNC: 13.1 G/DL — SIGNIFICANT CHANGE UP (ref 11.5–15.5)
IMM GRANULOCYTES NFR BLD AUTO: 0.5 % — SIGNIFICANT CHANGE UP (ref 0–0.9)
LYMPHOCYTES # BLD AUTO: 0.71 K/UL — LOW (ref 1–3.3)
LYMPHOCYTES # BLD AUTO: 8.2 % — LOW (ref 13–44)
MCHC RBC-ENTMCNC: 29.9 PG — SIGNIFICANT CHANGE UP (ref 27–34)
MCHC RBC-ENTMCNC: 32.5 GM/DL — SIGNIFICANT CHANGE UP (ref 32–36)
MCV RBC AUTO: 92 FL — SIGNIFICANT CHANGE UP (ref 80–100)
MONOCYTES # BLD AUTO: 0.26 K/UL — SIGNIFICANT CHANGE UP (ref 0–0.9)
MONOCYTES NFR BLD AUTO: 3 % — SIGNIFICANT CHANGE UP (ref 2–14)
NEUTROPHILS # BLD AUTO: 7.59 K/UL — HIGH (ref 1.8–7.4)
NEUTROPHILS NFR BLD AUTO: 88.2 % — HIGH (ref 43–77)
NRBC # BLD: 0 /100 WBCS — SIGNIFICANT CHANGE UP (ref 0–0)
PLATELET # BLD AUTO: 307 K/UL — SIGNIFICANT CHANGE UP (ref 150–400)
POTASSIUM SERPL-MCNC: 3.8 MMOL/L — SIGNIFICANT CHANGE UP (ref 3.5–5.3)
POTASSIUM SERPL-SCNC: 3.8 MMOL/L — SIGNIFICANT CHANGE UP (ref 3.5–5.3)
RBC # BLD: 4.38 M/UL — SIGNIFICANT CHANGE UP (ref 3.8–5.2)
RBC # FLD: 13.5 % — SIGNIFICANT CHANGE UP (ref 10.3–14.5)
SODIUM SERPL-SCNC: 141 MMOL/L — SIGNIFICANT CHANGE UP (ref 135–145)
WBC # BLD: 8.61 K/UL — SIGNIFICANT CHANGE UP (ref 3.8–10.5)
WBC # FLD AUTO: 8.61 K/UL — SIGNIFICANT CHANGE UP (ref 3.8–10.5)

## 2023-04-08 PROCEDURE — 99232 SBSQ HOSP IP/OBS MODERATE 35: CPT

## 2023-04-08 PROCEDURE — 93970 EXTREMITY STUDY: CPT | Mod: 26

## 2023-04-08 RX ORDER — LABETALOL HCL 100 MG
100 TABLET ORAL ONCE
Refills: 0 | Status: COMPLETED | OUTPATIENT
Start: 2023-04-08 | End: 2023-04-08

## 2023-04-08 RX ORDER — OXYCODONE HYDROCHLORIDE 5 MG/1
1 TABLET ORAL
Qty: 42 | Refills: 0
Start: 2023-04-08 | End: 2023-04-14

## 2023-04-08 RX ORDER — AMLODIPINE BESYLATE 2.5 MG/1
5 TABLET ORAL DAILY
Refills: 0 | Status: DISCONTINUED | OUTPATIENT
Start: 2023-04-08 | End: 2023-04-08

## 2023-04-08 RX ORDER — ACETAMINOPHEN 500 MG
2 TABLET ORAL
Qty: 0 | Refills: 0 | DISCHARGE
Start: 2023-04-08

## 2023-04-08 RX ORDER — SENNA PLUS 8.6 MG/1
2 TABLET ORAL
Qty: 0 | Refills: 0 | DISCHARGE
Start: 2023-04-08

## 2023-04-08 RX ORDER — POLYETHYLENE GLYCOL 3350 17 G/17G
17 POWDER, FOR SOLUTION ORAL
Qty: 0 | Refills: 0 | DISCHARGE
Start: 2023-04-08

## 2023-04-08 RX ORDER — AMLODIPINE BESYLATE 2.5 MG/1
1 TABLET ORAL
Qty: 30 | Refills: 0
Start: 2023-04-08 | End: 2023-05-07

## 2023-04-08 RX ADMIN — Medication 4 MILLIGRAM(S): at 06:22

## 2023-04-08 RX ADMIN — PANTOPRAZOLE SODIUM 40 MILLIGRAM(S): 20 TABLET, DELAYED RELEASE ORAL at 06:23

## 2023-04-08 RX ADMIN — AMLODIPINE BESYLATE 5 MILLIGRAM(S): 2.5 TABLET ORAL at 12:21

## 2023-04-08 RX ADMIN — POLYETHYLENE GLYCOL 3350 17 GRAM(S): 17 POWDER, FOR SOLUTION ORAL at 06:23

## 2023-04-08 RX ADMIN — Medication 4 MILLIGRAM(S): at 12:12

## 2023-04-08 RX ADMIN — Medication 4 MILLIGRAM(S): at 00:09

## 2023-04-08 RX ADMIN — Medication 100 MILLIGRAM(S): at 12:21

## 2023-04-08 RX ADMIN — Medication 100 MILLIGRAM(S): at 06:21

## 2023-04-08 NOTE — PROGRESS NOTE ADULT - ASSESSMENT
67F p/w Left C5 radiculopathy x3wks. With significant Left deltoid weakness and paresthesias. MRI shows degenerative changes and C4-5 disc bulge. Exam: AOx3, Left Delt 2/5, otherwise full strength. No Bowman's or clonus   (07 Apr 2023 04:08)    PROCEDURE: Adm 4/6 Anterior cervical discectomy and fusion (ACDF)  C4-5     POD#1    PLAN:  Neuro: 4/8 HMV DC'd this AM. HTN, but refusing BP Meds, said has appt to see her PMD. LED-P FU.  Inc activity/OOB. Med FU-P for HTN recomm. DC Home today after LED and BP mgt.    Hosp note of 4/7 seen for OR clearance. FU for BP mgt-P today.    Respiratory: Patient instructed to use incentive spirometer [ X] YES [ ] NO              DVT ppx: [X ] SQL [ ] SQH and Venodynes [ ] Left [ ] Right [ X] Bilateral    Discharge Planning:  The patient was evaluated by PT and recommended out patient PT.   She was subsequently DC on >>> in stable condition.    More than 30 minutes spent on total encounter: more than 50% of the visit was spent on educating the patient and family regarding condition, medications, follow up plans, signs and symptoms to be concerned with, preparing paperwork, and questions answered regarding discharge.       67F p/w Left C5 radiculopathy x3wks. With significant Left deltoid weakness and paresthesias. MRI shows degenerative changes and C4-5 disc bulge. Exam: AOx3, Left Delt 2/5, otherwise full strength. No Bowman's or clonus   (07 Apr 2023 04:08)    PROCEDURE: Adm 4/6 Anterior cervical discectomy and fusion (ACDF)  C4-5     POD#1    PLAN:  Neuro: 4/8 HMV DC'd this AM. HTN, but refusing BP Meds, said has appt to see her PMD-Med started Amlodipine today.  Inc activity/OOB.  DC Home today.    Hosp note of 4/8-Pt admitted for cervical neck surgery. Medicine consulted for preoperative medical optimization.    Problem/Plan - 1:·  Problem: Elevated blood pressure reading.   Plan: likely sec to surgery, IVF. pt does report h/o high normal BP. Labetolol 100 mg x 1 now. Amlodipine 5 mg daily starting today to be continued on discharge. Pt will take her BP daily and call her PMD if reading greater than 170/90 and will hold Amlodipine if <120/70. If needs BP med long-term, rec ACE-I/ARB. Pt will make appt with PMD in 2 days to f/u. Problem/Plan - 2:·  Problem: Cervical radiculopathy. ·  Plan: Anterior cervical discectomy and fusion 4/7/23. f/u and mgmt per neurosurg. Problem/Plan - 3:·  Problem: Prediabetes.   Plan: outpt f/u d/w pt.Electronic Signatures:Donell Haley (MD    Respiratory: Patient instructed to use incentive spirometer [ X] YES [ ] NO              DVT ppx: [X ] SQL [ ] SQH and Venodynes [ ] Left [ ] Right [ X] Bilateral    Discharge Planning:  The patient was evaluated by PT and recommended out patient PT.   She was subsequently DC on 4/8/23 in stable condition.    More than 30 minutes spent on total encounter: more than 50% of the visit was spent on educating the patient and family regarding condition, medications, follow up plans, signs and symptoms to be concerned with, preparing paperwork, and questions answered regarding discharge.

## 2023-04-08 NOTE — DISCHARGE NOTE PROVIDER - NSDCCPCAREPLAN_GEN_ALL_CORE_FT
PRINCIPAL DISCHARGE DIAGNOSIS  Diagnosis: Cervical herniated disc  Assessment and Plan of Treatment:

## 2023-04-08 NOTE — PROGRESS NOTE ADULT - PROBLEM SELECTOR PLAN 1
likely sec to surgery, IVF. pt does report h/o high normal BP. Labetolol 100 mg x 1 now. Amlodipine 5 mg daily starting today to be continued on discharge. Pt will take her BP daily and call her PMD if reading greater than 170/90 and will hold Amlodipine if <120/70. If needs BP med long-term, rec ACE-I/ARB. Pt will make appt with PMD in 2 days to f/u.

## 2023-04-08 NOTE — DISCHARGE NOTE PROVIDER - NSDCMRMEDTOKEN_GEN_ALL_CORE_FT
acetaminophen 325 mg oral tablet: 2 tab(s) orally every 6 hours As needed Temp greater or equal to 38C (100.4F), Mild Pain (1 - 3)  amLODIPine 5 mg oral tablet: 1 tab(s) orally once a day  calcium carbonate 500 mg (200 mg elemental calcium) oral tablet, chewable: 1 tab(s) orally every 4 hours, As needed, Heartburn  oxyCODONE 5 mg oral tablet: 1 tab(s) orally every 4 hours as needed for Moderate Pain (4 - 6) MDD: 6  polyethylene glycol 3350 oral powder for reconstitution: 17 gram(s) orally 2 times a day  senna leaf extract oral tablet: 2 tab(s) orally once a day (at bedtime)  terbinafine 250 mg oral tablet: 1 orally

## 2023-04-08 NOTE — PROVIDER CONTACT NOTE (OTHER) - ACTION/TREATMENT ORDERED:
IVP Hydralazine ordered, pt refusing medication at this time despite max encouragement. Pt states she never takes medication for her BP and she does not feel it is necessary. MD made aware.

## 2023-04-08 NOTE — CHART NOTE - NSCHARTNOTEFT_GEN_A_CORE
TREV [id number]      Drain type: []SD []SG [] ISAAC [X] HMV [] Lumbar drain [] EVD [] ICP [place] [] Abd drain     Patient's position while drain removed: Flat in bed/supine    [X] Patient tolerated well [] No complications [] complications:     Exit Site secured with: [] __ staples [] __ suture (please specify how many of each)     Additional Info: After d/w Dr Dietrich the Left ant neck HMV was DC'd this AM

## 2023-04-08 NOTE — DISCHARGE NOTE NURSING/CASE MANAGEMENT/SOCIAL WORK - PATIENT PORTAL LINK FT
You can access the FollowMyHealth Patient Portal offered by [facility] by registering at the following website: [email or web address]. By joining [facility]’s FollowMyHealth portal, you will also be able to view your health information using other applications (apps) compatible with our system.

## 2023-04-08 NOTE — PROVIDER CONTACT NOTE (OTHER) - ASSESSMENT
Pt A&Ox4. VS otherwise stable. Pt resting comfortably in bed. Pt denies pain and headache. No signs of distress noted.

## 2023-04-08 NOTE — PROGRESS NOTE ADULT - SUBJECTIVE AND OBJECTIVE BOX
Donell Haley   contact via pager or TEAMS        CC: Patient is a 67y old  Female who presents with a chief complaint of     SUBJECTIVE / OVERNIGHT EVENTS:    MEDICATIONS  (STANDING):  dexAMETHasone  Injectable 4 milliGRAM(s) IV Push every 6 hours  enoxaparin Injectable 40 milliGRAM(s) SubCutaneous every 24 hours  hydrALAZINE Injectable 5 milliGRAM(s) IV Push once  insulin lispro (ADMELOG) corrective regimen sliding scale   SubCutaneous Before meals and at bedtime  pantoprazole    Tablet 40 milliGRAM(s) Oral before breakfast  polyethylene glycol 3350 17 Gram(s) Oral two times a day  senna 2 Tablet(s) Oral at bedtime    MEDICATIONS  (PRN):  acetaminophen     Tablet .. 650 milliGRAM(s) Oral every 6 hours PRN Temp greater or equal to 38C (100.4F), Mild Pain (1 - 3)  bisacodyl 5 milliGRAM(s) Oral every 12 hours PRN Constipation  calcium carbonate    500 mG (Tums) Chewable 1 Tablet(s) Chew every 4 hours PRN Heartburn  oxyCODONE    IR 5 milliGRAM(s) Oral every 4 hours PRN Moderate Pain (4 - 6)  oxyCODONE    IR 10 milliGRAM(s) Oral every 4 hours PRN Severe Pain (7 - 10)      Vital Signs Last 24 Hrs  T(C): 36.8 (08 Apr 2023 08:20), Max: 36.8 (07 Apr 2023 21:17)  T(F): 98.2 (08 Apr 2023 08:20), Max: 98.3 (07 Apr 2023 21:17)  HR: 94 (08 Apr 2023 08:20) (64 - 97)  BP: 172/92 (08 Apr 2023 08:20) (132/68 - 179/101)  BP(mean): 100 (07 Apr 2023 19:00) (99 - 127)  RR: 18 (08 Apr 2023 08:20) (16 - 18)  SpO2: 95% (08 Apr 2023 08:20) (93% - 100%)  CAPILLARY BLOOD GLUCOSE      POCT Blood Glucose.: 130 mg/dL (08 Apr 2023 08:03)  POCT Blood Glucose.: 124 mg/dL (07 Apr 2023 16:47)    I&O's Summary    07 Apr 2023 07:01  -  08 Apr 2023 07:00  --------------------------------------------------------  IN: 580 mL / OUT: 905 mL / NET: -325 mL      tele:    PHYSICAL EXAM:    GENERAL: NAD   HEENT: EOMI, PERRL  PULM: Clear to auscultation bilaterally  CV: Regular rate and rhythm; nl S1, S2; No murmurs, rubs, or gallops  ABDOMEN: Soft, Nontender, Nondistended; Bowel sounds present  EXTREMITIES/MSK:  No edema, calf tenderness   PSYCH: AAOx3  NEUROLOGY: non-focal          LABS:                        13.1   8.61  )-----------( 307      ( 08 Apr 2023 06:56 )             40.3     04-08    141  |  103  |  16  ----------------------------<  166<H>  3.8   |  25  |  0.63    Ca    9.2      08 Apr 2023 06:57    TPro  6.8  /  Alb  4.0  /  TBili  0.2  /  DBili  x   /  AST  13  /  ALT  12  /  AlkPhos  74  04-06    PT/INR - ( 06 Apr 2023 22:47 )   PT: 11.9 sec;   INR: 1.03 ratio         PTT - ( 06 Apr 2023 22:47 )  PTT:30.8 sec            RADIOLOGY & ADDITIONAL TESTS:    Imaging Personally Reviewed:    Consultant(s) Notes Reviewed:      Care Discussed with Consultants/Other Providers:   Doenll Haley   contact via pager or TEAMS        CC: Patient is a 67y old  Female who presents with a chief complaint of     SUBJECTIVE / OVERNIGHT EVENTS: feels good. denies pain/HA/cp/sob. no abd pain, shoulder pain, arm pain.    MEDICATIONS  (STANDING):  dexAMETHasone  Injectable 4 milliGRAM(s) IV Push every 6 hours  enoxaparin Injectable 40 milliGRAM(s) SubCutaneous every 24 hours  hydrALAZINE Injectable 5 milliGRAM(s) IV Push once  insulin lispro (ADMELOG) corrective regimen sliding scale   SubCutaneous Before meals and at bedtime  pantoprazole    Tablet 40 milliGRAM(s) Oral before breakfast  polyethylene glycol 3350 17 Gram(s) Oral two times a day  senna 2 Tablet(s) Oral at bedtime    MEDICATIONS  (PRN):  acetaminophen     Tablet .. 650 milliGRAM(s) Oral every 6 hours PRN Temp greater or equal to 38C (100.4F), Mild Pain (1 - 3)  bisacodyl 5 milliGRAM(s) Oral every 12 hours PRN Constipation  calcium carbonate    500 mG (Tums) Chewable 1 Tablet(s) Chew every 4 hours PRN Heartburn  oxyCODONE    IR 5 milliGRAM(s) Oral every 4 hours PRN Moderate Pain (4 - 6)  oxyCODONE    IR 10 milliGRAM(s) Oral every 4 hours PRN Severe Pain (7 - 10)      Vital Signs Last 24 Hrs  T(C): 36.8 (08 Apr 2023 08:20), Max: 36.8 (07 Apr 2023 21:17)  T(F): 98.2 (08 Apr 2023 08:20), Max: 98.3 (07 Apr 2023 21:17)  HR: 94 (08 Apr 2023 08:20) (64 - 97)  BP: 172/92 (08 Apr 2023 08:20) (132/68 - 179/101)  BP(mean): 100 (07 Apr 2023 19:00) (99 - 127)  RR: 18 (08 Apr 2023 08:20) (16 - 18)  SpO2: 95% (08 Apr 2023 08:20) (93% - 100%)  CAPILLARY BLOOD GLUCOSE      POCT Blood Glucose.: 130 mg/dL (08 Apr 2023 08:03)  POCT Blood Glucose.: 124 mg/dL (07 Apr 2023 16:47)    I&O's Summary    07 Apr 2023 07:01  -  08 Apr 2023 07:00  --------------------------------------------------------  IN: 580 mL / OUT: 905 mL / NET: -325 mL          PHYSICAL EXAM:    GENERAL: NAD   HEENT: EOMI, PERRL  PULM: Clear to auscultation bilaterally  CV: Regular rate and rhythm; nl S1, S2; No murmurs, rubs, or gallops  ABDOMEN: Soft, Nontender, Nondistended; Bowel sounds present  EXTREMITIES/MSK:  No edema, calf tenderness   PSYCH: AAOx3  NEUROLOGY: non-focal          LABS:                        13.1   8.61  )-----------( 307      ( 08 Apr 2023 06:56 )             40.3     04-08    141  |  103  |  16  ----------------------------<  166<H>  3.8   |  25  |  0.63    Ca    9.2      08 Apr 2023 06:57    TPro  6.8  /  Alb  4.0  /  TBili  0.2  /  DBili  x   /  AST  13  /  ALT  12  /  AlkPhos  74  04-06    PT/INR - ( 06 Apr 2023 22:47 )   PT: 11.9 sec;   INR: 1.03 ratio         PTT - ( 06 Apr 2023 22:47 )  PTT:30.8 sec            RADIOLOGY & ADDITIONAL TESTS:    Imaging Personally Reviewed:    Consultant(s) Notes Reviewed:      Care Discussed with Consultants/Other Providers: neurosurg

## 2023-04-08 NOTE — DISCHARGE NOTE PROVIDER - NSDCCPTREATMENT_GEN_ALL_CORE_FT
PRINCIPAL PROCEDURE  Procedure: Anterior cervical discectomy and fusion (ACDF) at single level  Findings and Treatment:

## 2023-04-08 NOTE — PROGRESS NOTE ADULT - SUBJECTIVE AND OBJECTIVE BOX
SUBJECTIVE: Doing very well, except mild anxiety.  LUE better postop. NAD    OVERNIGHT EVENTS: HTN, but refused meds    Vital Signs Last 24 Hrs  T(C): 36.8 (08 Apr 2023 08:20), Max: 36.8 (07 Apr 2023 10:58)  T(F): 98.2 (08 Apr 2023 08:20), Max: 98.3 (07 Apr 2023 21:17)  HR: 94 (08 Apr 2023 08:20) (64 - 97)  BP: 172/92 (08 Apr 2023 08:20) (132/68 - 179/101)  BP(mean): 100 (07 Apr 2023 19:00) (99 - 127)  RR: 18 (08 Apr 2023 08:20) (16 - 18)  SpO2: 95% (08 Apr 2023 08:20) (93% - 100%)    Parameters below as of 08 Apr 2023 08:20  Patient On (Oxygen Delivery Method): room air    IVF: [X ] IVL [ ] NS+K@   DIET: [X ] Regular [ ] CCD [ ] Renal [ ] Puree [ ] Dysphagia [ ] Tube Feeds:   PCA: [ ] YES [X ] NO   BE: [ ] YES [ X] NO [ X] VOID   BM: [ ] YES [X ] NO     DRAINS: [ ] ISAAC (cc/24h) [X ] HMV 5 (cc/24h)    PHYSICAL EXAM:    Constitutional: No Acute Distress     Neurological: AAOx3, Following Commands, Moving all Extremities     Motor exam:          Upper extremity                         Delt     Bicep     Tricep    HG                                                 R         5/5        5/5        5/5       5/5                                               L         2-3/5     5/5        5/5       5/5          Lower extremity                        HF         KF        KE       DF         PF                                                  R        5/5        5/5        5/5       5/5         5/5                                               L         5/5        5/5       5/5       5/5          5/5                                                 Sensation: [X] intact to light touch  [] decreased:     Pulmonary: Clear to Auscultation, No rales, No rhonchi, No wheezes     Cardiovascular: S1, S2, Regular rate and rhythm     Gastrointestinal: Soft, Non-tender, Non-distended     Extremities: No calf tenderness     Incision: Lt Neck HMV x 1. +steri-CDI/Flat. No dysphagia/dysphonia    LABS:                        13.1   8.61  )-----------( 307      ( 08 Apr 2023 06:56 )             40.3    04-08    141  |  103  |  16  ----------------------------<  166<H>  3.8   |  25  |  0.63    Ca    9.2      08 Apr 2023 06:57    TPro  6.8  /  Alb  4.0  /  TBili  0.2  /  DBili  x   /  AST  13  /  ALT  12  /  AlkPhos  74  04-06    PT/INR - ( 06 Apr 2023 22:47 )   PT: 11.9 sec;   INR: 1.03 ratio    PTT - ( 06 Apr 2023 22:47 )  PTT:30.8 sec    MRSA/MSSA PCR (04.07.23 @ 09:24)  MRSA PCR Result.: NotDetec  Staph aureus PCR Result: NotDetec    COVID-19 PCR . (04.06.23 @ 22:47)  COVID-19 PCR: NotDetec    IMAGING:  < from: MR Cervical Spine No Cont (04.04.23 @ 11:02) >  C4-C5 left subarticular/foraminal disc protrusion resulting in left   lateral recess effacement and left neuroforaminal narrowing. Mild to   moderate spinal canal stenosis.    < end of copied text >    MEDICATIONS  (STANDING):  dexAMETHasone  Injectable 4 milliGRAM(s) IV Push every 6 hours  enoxaparin Injectable 40 milliGRAM(s) SubCutaneous every 24 hours  hydrALAZINE Injectable 5 milliGRAM(s) IV Push once  insulin lispro (ADMELOG) corrective regimen sliding scale   SubCutaneous Before meals and at bedtime  pantoprazole    Tablet 40 milliGRAM(s) Oral before breakfast  polyethylene glycol 3350 17 Gram(s) Oral two times a day  senna 2 Tablet(s) Oral at bedtime    MEDICATIONS  (PRN):  acetaminophen     Tablet .. 650 milliGRAM(s) Oral every 6 hours PRN Temp greater or equal to 38C (100.4F), Mild Pain (1 - 3)  bisacodyl 5 milliGRAM(s) Oral every 12 hours PRN Constipation  calcium carbonate    500 mG (Tums) Chewable 1 Tablet(s) Chew every 4 hours PRN Heartburn  oxyCODONE    IR 5 milliGRAM(s) Oral every 4 hours PRN Moderate Pain (4 - 6)  oxyCODONE    IR 10 milliGRAM(s) Oral every 4 hours PRN Severe Pain (7 - 10)   SUBJECTIVE: Doing very well, except mild anxiety.  LUE better postop. NAD    OVERNIGHT EVENTS: HTN, but refused meds    Vital Signs Last 24 Hrs  T(C): 36.8 (08 Apr 2023 08:20), Max: 36.8 (07 Apr 2023 10:58)  T(F): 98.2 (08 Apr 2023 08:20), Max: 98.3 (07 Apr 2023 21:17)  HR: 94 (08 Apr 2023 08:20) (64 - 97)  BP: 172/92 (08 Apr 2023 08:20) (132/68 - 179/101)  BP(mean): 100 (07 Apr 2023 19:00) (99 - 127)  RR: 18 (08 Apr 2023 08:20) (16 - 18)  SpO2: 95% (08 Apr 2023 08:20) (93% - 100%)    Parameters below as of 08 Apr 2023 08:20  Patient On (Oxygen Delivery Method): room air    IVF: [X ] IVL [ ] NS+K@   DIET: [X ] Regular [ ] CCD [ ] Renal [ ] Puree [ ] Dysphagia [ ] Tube Feeds:   PCA: [ ] YES [X ] NO   BE: [ ] YES [ X] NO [ X] VOID   BM: [ ] YES [X ] NO     DRAINS: [ ] ISAAC (cc/24h) [X ] HMV 5 (cc/24h)    PHYSICAL EXAM:    Constitutional: No Acute Distress     Neurological: AAOx3, Following Commands, Moving all Extremities     Motor exam:          Upper extremity                         Delt     Bicep     Tricep    HG                                                 R         5/5        5/5        5/5       5/5                                               L         2-3/5     5/5        5/5       5/5          Lower extremity                        HF         KF        KE       DF         PF                                                  R        5/5        5/5        5/5       5/5         5/5                                               L         5/5        5/5       5/5       5/5          5/5                                                 Sensation: [X] intact to light touch  [] decreased:     Pulmonary: Clear to Auscultation, No rales, No rhonchi, No wheezes     Cardiovascular: S1, S2, Regular rate and rhythm     Gastrointestinal: Soft, Non-tender, Non-distended     Extremities: No calf tenderness     Incision: Lt Neck HMV x 1. +steri-CDI/Flat. No dysphagia/dysphonia    LABS:                        13.1   8.61  )-----------( 307      ( 08 Apr 2023 06:56 )             40.3    04-08    141  |  103  |  16  ----------------------------<  166<H>  3.8   |  25  |  0.63    Ca    9.2      08 Apr 2023 06:57    TPro  6.8  /  Alb  4.0  /  TBili  0.2  /  DBili  x   /  AST  13  /  ALT  12  /  AlkPhos  74  04-06    PT/INR - ( 06 Apr 2023 22:47 )   PT: 11.9 sec;   INR: 1.03 ratio    PTT - ( 06 Apr 2023 22:47 )  PTT:30.8 sec    MRSA/MSSA PCR (04.07.23 @ 09:24)  MRSA PCR Result.: NotDetec  Staph aureus PCR Result: NotDetec    COVID-19 PCR . (04.06.23 @ 22:47)  COVID-19 PCR: NotDetec    IMAGING:  < from: [facility] Duplex Lower Ext Vein Scan, Bilat (04.08.23 @ 10:47) >  IMPRESSION:  No evidence of deep venous thrombosis in either lower extremity.    < end of copied text >    < from: MR Cervical Spine No Cont (04.04.23 @ 11:02) >  C4-C5 left subarticular/foraminal disc protrusion resulting in left   lateral recess effacement and left neuroforaminal narrowing. Mild to   moderate spinal canal stenosis.    < end of copied text >    MEDICATIONS  (STANDING):  dexAMETHasone  Injectable 4 milliGRAM(s) IV Push every 6 hours  enoxaparin Injectable 40 milliGRAM(s) SubCutaneous every 24 hours  hydrALAZINE Injectable 5 milliGRAM(s) IV Push once  insulin lispro (ADMELOG) corrective regimen sliding scale   SubCutaneous Before meals and at bedtime  pantoprazole    Tablet 40 milliGRAM(s) Oral before breakfast  polyethylene glycol 3350 17 Gram(s) Oral two times a day  senna 2 Tablet(s) Oral at bedtime    MEDICATIONS  (PRN):  acetaminophen     Tablet .. 650 milliGRAM(s) Oral every 6 hours PRN Temp greater or equal to 38C (100.4F), Mild Pain (1 - 3)  bisacodyl 5 milliGRAM(s) Oral every 12 hours PRN Constipation  calcium carbonate    500 mG (Tums) Chewable 1 Tablet(s) Chew every 4 hours PRN Heartburn  oxyCODONE    IR 5 milliGRAM(s) Oral every 4 hours PRN Moderate Pain (4 - 6)  oxyCODONE    IR 10 milliGRAM(s) Oral every 4 hours PRN Severe Pain (7 - 10)

## 2023-04-08 NOTE — DISCHARGE NOTE PROVIDER - CARE PROVIDER_API CALL
Adrian Dietrich (MD)  Neurosurgery  [address], [address]  [place] [address]  Phone: [phone]  Fax: [phone]  Follow Up Time:

## 2023-04-08 NOTE — DISCHARGE NOTE PROVIDER - HOSPITAL COURSE
67F p/w Left C5 radiculopathy x3wks. With significant Left deltoid weakness and paresthesias. MRI shows degenerative changes and C4-5 disc bulge. Exam: AOx3, Left Delt 2/5, otherwise full strength. No Bowman's or clonus   (07 Apr 2023 04:08)    PROCEDURE: Adm 4/6 Anterior cervical discectomy and fusion (ACDF)  C4-5     POD#1    PLAN:  Neuro: 4/8 HMV DC'd this AM. HTN, but refusing BP Meds, said has appt to see her PMD-Med started Amlodipine today.  Inc activity/OOB.  DC Home today.    Hosp note of 4/8-Pt admitted for cervical neck surgery. Medicine consulted for preoperative medical optimization.    Problem/Plan - 1:·  Problem: Elevated blood pressure reading.   Plan: likely sec to surgery, IVF. pt does report h/o high normal BP. Labetolol 100 mg x 1 now. Amlodipine 5 mg daily starting today to be continued on discharge. Pt will take her BP daily and call her PMD if reading greater than 170/90 and will hold Amlodipine if <120/70. If needs BP med long-term, rec ACE-I/ARB. Pt will make appt with PMD in 2 days to f/u. Problem/Plan - 2:·  Problem: Cervical radiculopathy. ·  Plan: Anterior cervical discectomy and fusion 4/7/23. f/u and mgmt per neurosurg. Problem/Plan - 3:·  Problem: Prediabetes.   Plan: outpt f/u d/w pt.Electronic Signatures:Donell Haley (MD    Respiratory: Patient instructed to use incentive spirometer [ X] YES [ ] NO              DVT ppx: [X ] SQL [ ] SQH and Venodynes [ ] Left [ ] Right [ X] Bilateral    Discharge Planning:  The patient was evaluated by PT and recommended out patient PT.   She was subsequently DC on 4/8/23 in stable condition.

## 2023-04-08 NOTE — DISCHARGE NOTE PROVIDER - NSDCFUADDAPPT_GEN_ALL_CORE_FT
Please call your Neurosurgeon for an appointment within 1 week after your hospital discharge for wound check and further recommendations.    Followup with your Nedra MD in 1 week for blood pressure management

## 2023-04-10 ENCOUNTER — TRANSCRIPTION ENCOUNTER (OUTPATIENT)
Age: 68
End: 2023-04-10

## 2023-04-11 ENCOUNTER — NON-APPOINTMENT (OUTPATIENT)
Age: 68
End: 2023-04-11

## 2023-04-18 ENCOUNTER — NON-APPOINTMENT (OUTPATIENT)
Age: 68
End: 2023-04-18

## 2023-04-19 ENCOUNTER — APPOINTMENT (OUTPATIENT)
Dept: SPINE | Facility: CLINIC | Age: 68
End: 2023-04-19
Payer: MEDICARE

## 2023-04-19 ENCOUNTER — NON-APPOINTMENT (OUTPATIENT)
Age: 68
End: 2023-04-19

## 2023-04-19 VITALS
OXYGEN SATURATION: 98 % | HEART RATE: 74 BPM | DIASTOLIC BLOOD PRESSURE: 76 MMHG | HEIGHT: 65 IN | WEIGHT: 258 LBS | BODY MASS INDEX: 42.99 KG/M2 | SYSTOLIC BLOOD PRESSURE: 147 MMHG

## 2023-04-19 PROCEDURE — 99024 POSTOP FOLLOW-UP VISIT: CPT

## 2023-04-19 NOTE — PHYSICAL EXAM
[General Appearance - Alert] : alert [General Appearance - In No Acute Distress] : in no acute distress [General Appearance - Well Nourished] : well nourished [General Appearance - Well Developed] : well developed [General Appearance - Well-Appearing] : healthy appearing [] : normal voice and communication [Clean] : clean [Dry] : dry [Healing Well] : healing well [Intact] : intact [Sutures Intact] : closed with intact sutures [No Drainage] : without drainage [Normal Skin] : normal [Erythema] : not erythematous [Normal Skin Turgor] : skin turgor was normal [Warm] : not warm [FreeTextEntry6] : Steri strips intact. [Oriented To Time, Place, And Person] : oriented to person, place, and time [Affect] : the affect was normal [Impaired Insight] : insight and judgment were intact [Mood] : the mood was normal [Memory Recent] : recent memory was not impaired [Memory Remote] : remote memory was not impaired [Person] : oriented to person [Place] : oriented to place [Time] : oriented to time [Short Term Intact] : short term memory intact [Remote Intact] : remote memory intact [Span Intact] : the attention span was normal [Fluency] : fluency intact [Concentration Intact] : normal concentrating ability [Comprehension] : comprehension intact [Current Events] : adequate knowledge of current events [Past History] : adequate knowledge of personal past history [Vocabulary] : adequate range of vocabulary [Cranial Nerves Optic (II)] : visual acuity intact bilaterally,  pupils equal round and reactive to light [Cranial Nerves Oculomotor (III)] : extraocular motion intact [Cranial Nerves Facial (VII)] : face symmetrical [Cranial Nerves Trigeminal (V)] : facial sensation intact symmetrically [Cranial Nerves Vestibulocochlear (VIII)] : hearing was intact bilaterally [Cranial Nerves Glossopharyngeal (IX)] : tongue and palate midline [Cranial Nerves Accessory (XI - Cranial And Spinal)] : head turning and shoulder shrug symmetric [Cranial Nerves Hypoglossal (XII)] : there was no tongue deviation with protrusion [Motor Tone] : muscle tone was normal in all four extremities [No Muscle Atrophy] : normal bulk in all four extremities [2] : C5 deltoid 2/5 [4] : C5 Biceps 4/5 [5] : S1 flexor hallucis longus 5/5 [Sensation Tactile Decrease] : light touch was intact [Abnormal Walk] : normal gait [Balance] : balance was intact [Past-pointing] : there was no past-pointing [Tremor] : no tremor present [2+] : Patella left 2+ [___] : absent on the right [___] : absent on the left [Bowman] : Bowman's sign was not demonstrated [FreeTextEntry1] : The patient can lift her arm about 30 degrees from her side.

## 2023-04-19 NOTE — ASSESSMENT
[FreeTextEntry1] : The steri strips were removed and the Prolene suture was removed.  Wound care was discussed.  The patient may shower, use soap and water and is to rinse and pat the incision dry gently.  She is to leave the incision uncovered but should protect it from the sun.  Activity levels and proper body mechanics were discussed.  The patient is to avoid bending and lifting and is to put her electronic devices such as her phone and computer at eye level.  The patient is to continue physical therapy and is to do her exercises every day at home.  She is to return to the office in two weeks with cervical spine AP and lateral x rays for review.

## 2023-04-19 NOTE — HISTORY OF PRESENT ILLNESS
[FreeTextEntry1] : TREV POZO is a 67 year old lady who awoke in March with severe pain in her left neck without any significant radiation but was associated with significant weakness of the left deltoid and biceps.  She was unable to lift her arm up at her shoulder and had weakness in her left biceps.  She had no right sided symptomatology.  The pain resolved a few days later but she was left with the significant weakness.  She had an MRI of the cervical spine which revealed a moderate sized left focal disc herniation at C4-C5 with compression of the C5 nerve root.  The patient underwent a C4-C5 anterior cervical discectomy and fusion on 04/07/2023.\par

## 2023-04-19 NOTE — REASON FOR VISIT
[de-identified] : 1. C4-C5 anterior cervical discectomy. 2. Placement of intervertebral device with intervertebral arthrodesis. 3. Anterior instrumentation.  Surgeon:  Dr. Adrian Dietrich M.D. [de-identified] : 04/07/2023 [de-identified] : 12 [de-identified] : 2 [de-identified] : The patient stated that she is doing better and is able to raise her left arm at least 30 degrees from her side.  She was able to lift her arm and close the car door without thinking.  She is doing physical therapy and has some muscular soreness in her upper arm and shoulder.  She denies having significant pain and is not taking any medication.

## 2023-04-20 PROCEDURE — C9399: CPT

## 2023-04-20 PROCEDURE — 36415 COLL VENOUS BLD VENIPUNCTURE: CPT

## 2023-04-20 PROCEDURE — 82962 GLUCOSE BLOOD TEST: CPT

## 2023-04-20 PROCEDURE — 86901 BLOOD TYPING SEROLOGIC RH(D): CPT

## 2023-04-20 PROCEDURE — 93970 EXTREMITY STUDY: CPT

## 2023-04-20 PROCEDURE — C1889: CPT

## 2023-04-20 PROCEDURE — 86900 BLOOD TYPING SEROLOGIC ABO: CPT

## 2023-04-20 PROCEDURE — 86850 RBC ANTIBODY SCREEN: CPT

## 2023-04-20 PROCEDURE — 85025 COMPLETE CBC W/AUTO DIFF WBC: CPT

## 2023-04-20 PROCEDURE — 80053 COMPREHEN METABOLIC PANEL: CPT

## 2023-04-20 PROCEDURE — 86803 HEPATITIS C AB TEST: CPT

## 2023-04-20 PROCEDURE — C1713: CPT

## 2023-04-20 PROCEDURE — 85610 PROTHROMBIN TIME: CPT

## 2023-04-20 PROCEDURE — 87641 MR-STAPH DNA AMP PROBE: CPT

## 2023-04-20 PROCEDURE — 99285 EMERGENCY DEPT VISIT HI MDM: CPT | Mod: 25

## 2023-04-20 PROCEDURE — U0005: CPT

## 2023-04-20 PROCEDURE — U0003: CPT

## 2023-04-20 PROCEDURE — 87640 STAPH A DNA AMP PROBE: CPT

## 2023-04-20 PROCEDURE — 83036 HEMOGLOBIN GLYCOSYLATED A1C: CPT

## 2023-04-20 PROCEDURE — 85730 THROMBOPLASTIN TIME PARTIAL: CPT

## 2023-04-20 PROCEDURE — 76000 FLUOROSCOPY <1 HR PHYS/QHP: CPT

## 2023-04-20 PROCEDURE — 97165 OT EVAL LOW COMPLEX 30 MIN: CPT

## 2023-04-20 PROCEDURE — 97161 PT EVAL LOW COMPLEX 20 MIN: CPT

## 2023-04-20 PROCEDURE — 80048 BASIC METABOLIC PNL TOTAL CA: CPT

## 2023-04-21 ENCOUNTER — OUTPATIENT (OUTPATIENT)
Dept: OUTPATIENT SERVICES | Facility: HOSPITAL | Age: 68
LOS: 1 days | End: 2023-04-21
Payer: MEDICARE

## 2023-04-21 ENCOUNTER — APPOINTMENT (OUTPATIENT)
Dept: RADIOLOGY | Facility: HOSPITAL | Age: 68
End: 2023-04-21
Payer: MEDICARE

## 2023-04-21 DIAGNOSIS — Z98.1 ARTHRODESIS STATUS: ICD-10-CM

## 2023-04-21 DIAGNOSIS — M54.12 RADICULOPATHY, CERVICAL REGION: ICD-10-CM

## 2023-04-21 PROCEDURE — 72040 X-RAY EXAM NECK SPINE 2-3 VW: CPT | Mod: 26

## 2023-04-21 PROCEDURE — 72040 X-RAY EXAM NECK SPINE 2-3 VW: CPT

## 2023-05-03 ENCOUNTER — APPOINTMENT (OUTPATIENT)
Dept: SPINE | Facility: CLINIC | Age: 68
End: 2023-05-03
Payer: MEDICARE

## 2023-05-03 ENCOUNTER — RESULT REVIEW (OUTPATIENT)
Age: 68
End: 2023-05-03

## 2023-05-03 VITALS
BODY MASS INDEX: 42.99 KG/M2 | HEART RATE: 73 BPM | HEIGHT: 65 IN | DIASTOLIC BLOOD PRESSURE: 84 MMHG | WEIGHT: 258 LBS | SYSTOLIC BLOOD PRESSURE: 152 MMHG | OXYGEN SATURATION: 97 %

## 2023-05-03 DIAGNOSIS — M54.12 RADICULOPATHY, CERVICAL REGION: ICD-10-CM

## 2023-05-03 PROCEDURE — 99024 POSTOP FOLLOW-UP VISIT: CPT

## 2023-05-30 NOTE — PHYSICAL THERAPY INITIAL EVALUATION ADULT - HEALTH SCREEN CRITERIA
Patient here today for nurse blood pressure check.    BP Readings from Last 1 Encounters:   05/30/23 1608 132/82     Pulse Readings from Last 1 Encounters:   05/30/23 72     Patient Reported Vitals    There is no flowsheet data to display.          Last Clinician Visit for this condition: 5/16/23  Per that visit, plan of care: Essential hypertension, benign:  Blood pressure remains elevated.   He is going to come back for a nurse visit to recheck his blood pressure in two weeks. It has not been an issue before.   Continue potassium chloride 20 meq. Refills provided.   Next office visit is scheduled for: 11/16/2023    Current BP Medications are: Verapamil 240 mg, terazosin 10 mg, chlorthalidone 25 mg  Last refilled: 3/15/23    Please review and advise if there are any changes to current plan of care.  Next Nurse BP visit scheduled: No     yes

## 2023-06-07 ENCOUNTER — NON-APPOINTMENT (OUTPATIENT)
Age: 68
End: 2023-06-07

## 2023-07-10 ENCOUNTER — APPOINTMENT (OUTPATIENT)
Dept: RADIOLOGY | Facility: CLINIC | Age: 68
End: 2023-07-10
Payer: MEDICARE

## 2023-07-10 PROCEDURE — 72040 X-RAY EXAM NECK SPINE 2-3 VW: CPT

## 2023-08-04 ENCOUNTER — APPOINTMENT (OUTPATIENT)
Dept: ULTRASOUND IMAGING | Facility: CLINIC | Age: 68
End: 2023-08-04
Payer: MEDICARE

## 2023-08-04 ENCOUNTER — APPOINTMENT (OUTPATIENT)
Dept: MAMMOGRAPHY | Facility: CLINIC | Age: 68
End: 2023-08-04
Payer: MEDICARE

## 2023-08-04 PROCEDURE — 76641 ULTRASOUND BREAST COMPLETE: CPT | Mod: 50,GY

## 2023-08-04 PROCEDURE — 77067 SCR MAMMO BI INCL CAD: CPT

## 2023-08-04 PROCEDURE — 77063 BREAST TOMOSYNTHESIS BI: CPT

## 2023-08-16 ENCOUNTER — APPOINTMENT (OUTPATIENT)
Dept: SPINE | Facility: CLINIC | Age: 68
End: 2023-08-16
Payer: MEDICARE

## 2023-08-16 VITALS
OXYGEN SATURATION: 94 % | HEART RATE: 69 BPM | SYSTOLIC BLOOD PRESSURE: 151 MMHG | WEIGHT: 258 LBS | DIASTOLIC BLOOD PRESSURE: 81 MMHG | HEIGHT: 65 IN | BODY MASS INDEX: 42.99 KG/M2

## 2023-08-16 PROCEDURE — 99213 OFFICE O/P EST LOW 20 MIN: CPT

## 2023-10-19 NOTE — HISTORY OF PRESENT ILLNESS
[de-identified] : Pain in the left knee for about 2 weeks or so while getting ready to go on vacation and believes it was due to going into squatting position. Initially did not have pain but started as the week progressed. Pain with most motion and activity when WB. No pain otherwise in the area.  Propranolol Counseling:  I discussed with the patient the risks of propranolol including but not limited to low heart rate, low blood pressure, low blood sugar, restlessness and increased cold sensitivity. They should call the office if they experience any of these side effects.

## 2024-03-30 ENCOUNTER — APPOINTMENT (OUTPATIENT)
Dept: CT IMAGING | Facility: CLINIC | Age: 69
End: 2024-03-30
Payer: MEDICARE

## 2024-03-30 ENCOUNTER — OUTPATIENT (OUTPATIENT)
Dept: OUTPATIENT SERVICES | Facility: HOSPITAL | Age: 69
LOS: 1 days | End: 2024-03-30
Payer: MEDICARE

## 2024-03-30 DIAGNOSIS — Z98.1 ARTHRODESIS STATUS: ICD-10-CM

## 2024-03-30 PROCEDURE — 72125 CT NECK SPINE W/O DYE: CPT

## 2024-03-30 PROCEDURE — 72125 CT NECK SPINE W/O DYE: CPT | Mod: 26,MH

## 2024-05-06 NOTE — ASSESSMENT
[FreeTextEntry1] : 68 year old female one year s/p C4-C5 anterior cervical discectomy and fusion on 04/07/2023.

## 2024-05-06 NOTE — HISTORY OF PRESENT ILLNESS
[FreeTextEntry1] : TREV POZO is a 68 year old lady who awoke in March 2023 with severe pain in her left neck without any significant radiation but was associated with significant weakness of the left deltoid and biceps. She was unable to lift her arm up at her shoulder and had weakness in her left biceps. She had no right sided symptomatology. The pain resolved a few days later but she was left with the significant weakness. She had an MRI of the cervical spine which revealed a moderate sized left focal disc herniation at C4-C5 with compression of the C5 nerve root. The patient underwent a C4-C5 anterior cervical discectomy and fusion on 04/07/2023.  The patient was last seen on 8/16/2023, she reported improvement of her strength. She was able to lift both arms above her head and bring her elbow out at 90 degrees. She did report her voice was hoarse following surgery and inability to project her voice as she was before surgery. ENT evaluation with Dr. Rivera was normal. The patient was referred to speech therapy.  She returns today

## 2024-05-07 ENCOUNTER — NON-APPOINTMENT (OUTPATIENT)
Age: 69
End: 2024-05-07

## 2024-05-08 ENCOUNTER — APPOINTMENT (OUTPATIENT)
Dept: SPINE | Facility: CLINIC | Age: 69
End: 2024-05-08
Payer: MEDICARE

## 2024-05-08 VITALS
HEIGHT: 65 IN | SYSTOLIC BLOOD PRESSURE: 154 MMHG | BODY MASS INDEX: 42.99 KG/M2 | DIASTOLIC BLOOD PRESSURE: 83 MMHG | HEART RATE: 68 BPM | WEIGHT: 258 LBS | OXYGEN SATURATION: 96 %

## 2024-05-08 DIAGNOSIS — M50.20 OTHER CERVICAL DISC DISPLACEMENT, UNSPECIFIED CERVICAL REGION: ICD-10-CM

## 2024-05-08 PROCEDURE — 99213 OFFICE O/P EST LOW 20 MIN: CPT

## 2024-05-08 RX ORDER — ATORVASTATIN CALCIUM 80 MG/1
TABLET, FILM COATED ORAL
Refills: 0 | Status: ACTIVE | COMMUNITY

## 2024-05-15 NOTE — PATIENT PROFILE ADULT. - FUNCTIONAL LEVEL PRIOR: AMBULATION
[facility] ED Admit Snapshot                                           Diagnosis:  Convulsions, unspecified convulsion type  (CMD)  (primary encounter diagnosis)      CMS work-up in progress: N/A    Orientation: oriented to time, place, and person    Safety:  N/A    Mobility:  Up with Assist    Last set of vitals:  Patient Vitals for the past 24 hrs:   BP Temp Temp src Pulse Resp SpO2   05/14/24 2000 118/57 -- -- 85 16 97 %   05/14/24 1942 111/58 -- -- 85 -- 97 %   05/14/24 1927 109/55 -- -- 85 20 97 %   05/14/24 1842 113/53 -- -- 84 18 97 %   05/14/24 1757 117/53 -- -- 87 19 98 %   05/14/24 1751 117/59 -- -- 93 17 97 %   05/14/24 1721 116/57 -- -- 89 18 97 %   05/14/24 1635 100/55 -- -- 85 16 98 %   05/14/24 1605 -- -- -- 91 20 99 %   05/14/24 1601 121/58 -- -- 92 18 98 %   05/14/24 1538 116/54 -- -- 95 -- 98 %   05/14/24 1447 -- 97.6 °F (36.4 °C) Oral -- -- --   05/14/24 1445 112/53 -- -- (!) 102 18 92 %                Pain: No reports of pain    Infusions: None    Special Considerations: None      Dinorah Cade RN    Extension:  1405                   (0) independent

## 2024-05-28 ENCOUNTER — APPOINTMENT (OUTPATIENT)
Dept: OTOLARYNGOLOGY | Facility: CLINIC | Age: 69
End: 2024-05-28
Payer: MEDICARE

## 2024-05-28 VITALS — BODY MASS INDEX: 44.05 KG/M2 | WEIGHT: 258 LBS | HEIGHT: 64 IN

## 2024-05-28 DIAGNOSIS — R49.0 DYSPHONIA: ICD-10-CM

## 2024-05-28 DIAGNOSIS — Z98.1 ARTHRODESIS STATUS: ICD-10-CM

## 2024-05-28 PROCEDURE — 99203 OFFICE O/P NEW LOW 30 MIN: CPT | Mod: 25

## 2024-05-28 PROCEDURE — 31579 LARYNGOSCOPY TELESCOPIC: CPT

## 2024-05-28 RX ORDER — ASPIRIN 81 MG/1
81 TABLET ORAL
Refills: 0 | Status: ACTIVE | COMMUNITY

## 2024-05-28 RX ORDER — TERBINAFINE HYDROCHLORIDE 250 MG/1
250 TABLET ORAL
Qty: 30 | Refills: 0 | Status: COMPLETED | COMMUNITY
Start: 2022-11-29 | End: 2024-05-28

## 2024-05-28 NOTE — ASSESSMENT
[FreeTextEntry1] : Assessment/Plan: #1 Muscle tension dysphonia  After a thorough discussion, the patient understands that they have the diagnosis of muscle tension dysphonia.  This is caused by the excessive use of different muscles in the throat with phonation.  The standard treatment for this is voice therapy with a speech language pathologist.  This typically involves a first session of a Voice Evaluation where different measurements are taken of their voice and voice therapy is started.  It often involves a few more sessions of Voice Therapy afterwards.  The patient would be taught different exercises in order to release the tension in their larynx and return to their prior normal voice.  They understand that voice therapy will not work without doing the practice at home as prescribed.  They were also given the option of observation.  At this time the patient has elected to proceed forward with voice therapy.   Should this fail we would likely proceed forward with in office bilateral injection laryngoplasty. Regardless I would want to see her when done with voice therapy.   There is no evidence of left SLN injury but this can not be ruled out 100% especially in setting of severe MTD.

## 2024-05-28 NOTE — HISTORY OF PRESENT ILLNESS
[de-identified] : TREV POZO is a 68 year old woman who presents to the [facility] with difficulty phonating. She is referred by Dr. Kelli Fonseca. S/p ACDF 4/4/23.  She now does note periods of normal voicing. Reports voice was 55% normal immediately after surgery and has slowly improved to 65% of normal now.   She does not note specific difficulties with swallowing. She does not note frequent classic heartburn symptoms. Smoking history: none. Has issues with projection and getting loud and also states can no longer sing.   Has much more issues with upper range.  Was previously seen by outside ENT and told normal FOE.   VOCAL DEMANDS: Her vocal demands are primarily those of general conversation Has previously done 2 oustide sessions of voice therapy.   Prior Pertinent Procedures: 4/4/23: ACDF C4-C5

## 2024-05-28 NOTE — PROCEDURE
[de-identified] : Stroboscopic Laryngoscopy Procedure Note:  Indication:	Assess laryngeal biomechanics and vocal fold oscillation.  Description of Procedure:	Informed consent was verbally obtained from the patient prior to the procedure. The patient was seated in the clinic chair. Topical anesthesia was achieved by first spraying the nasal cavities with 4% lidocaine and nasal decongestant.   Findings:  Supraglottis: no masses or lesions  Glottis:    Structure:                        Right: crisp and shows no lesions or masses                        Left:  crisp and shows no lesions or masses                 Mobility:                        Right:  normal                        Left:  normal                Amplitude:                        Right:  normal                       Left:  normal                Closure: complete                 Wave symmetry:  symmetric  Subglottis: no masses or lesions within the visualized subglottis Visualized airway is widely patent. Other: severe lateral supraglottic squeeze on phonation

## 2024-06-10 ENCOUNTER — APPOINTMENT (OUTPATIENT)
Dept: OTOLARYNGOLOGY | Facility: CLINIC | Age: 69
End: 2024-06-10
Payer: MEDICARE

## 2024-06-10 PROCEDURE — 92520 LARYNGEAL FUNCTION STUDIES: CPT | Mod: GN

## 2024-06-10 PROCEDURE — 92524 BEHAVRAL QUALIT ANALYS VOICE: CPT | Mod: GN

## 2024-06-10 NOTE — ASSESSMENT
[FreeTextEntry1] : VOICE EVALUATION  Patient Name: Abby Rodriguez Date of Evaluation: 6/10/24 : 1955 Primary Diagnosis: Dysphonia R49.0 Treatment Diagnosis: Dysphonia R49.0 Referring Physician: Dr. Freeman Luu Procedure Code: 66117  REASON FOR REFERRAL: Abby Rodriguez is a 68-year-old female who participated in a comprehensive voice evaluation to assess vocal functioning in light of diagnosis of severe muscle tension dysphonia, referred by Dr. Luu.   HISTORY OF PRESENTING ILLNESS: Patient arrived unaccompanied. Upon probing, patient reported ongoing vocal difficulties following a ACDF surgery completed in 2023. She noted prior normal voicing. Following surgery and currently, she is noted to have loss of vocal range and projection, lack of ability to sing and notes that her voice sounds like a "frog."  Upon probing, she denies extensive voice use/heavy vocal demands. 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 Pt went for prior voice therapy sessions but stopped as she did not see a benefit. She reports increased willingness to participate at this time. Upon probing, she reported she is adequately hydrated and does not smoke.   Medical History: Abnormal EKG (794.31) (R94.31) Acute internal derangement of left knee (717.9) (M23.92) Cervical herniated disc (722.0) (M50.20) Cervical radiculopathy (723.4) (M54.12) MANLEY (dyspnea on exertion) (786.09) (R06.09) Dyslipidemia (272.4) (E78.5) Elevated erythrocyte sedimentation rate (790.1) (R70.0) Hernia (553.9) (K46.9) History of cardiac murmur (V12.59) (Z86.79) History of hypertension (V12.59) (Z86.79) Impaired fasting blood sugar (790.21) (R73.01) Left arm weakness (729.89) (R29.898) Morbid obesity (278.01) (E66.01) Neck pain on left side (723.1) (M54.2) Osteopenia (733.90) (M85.80) Postop check (V67.00) (Z09) Primary osteoarthritis of left knee (715.16) (M17.12) S/P cervical spinal fusion (V45.4) (Z98.1)     Laryngoscopy 24 Findings: Findings: Supraglottis: no masses or lesions Glottis: Structure:  Right: crisp and shows no lesions or masses  Left: crisp and shows no lesions or masses  Mobility:  Right: normal  Left: normal  Amplitude:  Right: normal  Left: normal  Closure: complete  Wave symmetry: symmetric Subglottis: no masses or lesions within the visualized subglottis Visualized airway is widely patent. Other: severe lateral supraglottic squeeze on phonation.  MEDICATIONS: Were reviewed and can be located in the patient's chart Allergies: No reported food or drug allergies  CLINICAL FINDINGS Oral Peripheral Assessment / Detailed Head and Neck Exam Structures: Within Functional Limits Symmetry: Within functional limits Dentition: WFLs Soft Palate: WFLs Labial: WFLs Lingual: WFLs Mandibular: WFL Buccal: WFLs Secretions: WFLs Volitional Swallow: Adequate Volitional Cough: WFLs Cognition/Communication: WFLs Motor Speech: WFLs  CLINICAL FINDINGS: Perceptual Voice Analysis Vocal Quality: Strained, hoarse Severity: Mild Loudness Level: WF Pitch: Perceptually low, pitch breaks during sustained phonation Musculoskeletal Tension: Present Location: Neck, clavicle, shoulders Respiration: Thoracic/Clavicular Tone Focus: Back Type of Onset: hard glottal attack Laryngeal: [facility] Maximum Phonation Duration: 18.29 seconds, WFLs  VISIPITCH DATA: Sustained Phonation Fo: 219 Hz (normal - 143-235 Hz) WFLs Intensity: 62 dB (Normal- 65-80 dB) Reduced Percent Voiced: 100% / 100 - WFLs Range: 12 Hz (Normal- <20 Hz) WFLs  Conversational Passage: Fo: 156 Hz (normal - 143-235 Hz) WFLs  Intensity: 58 dB (Normal- 65-80 dB) Reduced   Range: max:  min:   INTERPRETATION: Mild dysphonia  PROGNOSIS: Good with therapeutic intervention and patient participation  RECOMMENDATIONS: 1) Voice Therapy (CPT - 02648) is recommended 1x/week for 6-8 weeks.  2) Adhere to vocal hygiene and LPRD lifestyle modifications 3) Follow up with physician as directed  EDUCATION: Verbal and written educational information and instruction were provided to the patient.  Treatment Plan Long Term Goals: 1. The patient will Demonstrate improved vocal quality/loudness/intonation for sustained vocalization/speech at word/phrase/sentence/conversational level.  Short-Term Goals: 1. Patient will verbalize strategies to reduce vocal hyperfunction with min cues 2. Patient will establish a diaphragmatic breathing pattern at rest and during speech with minimum cues 3. Learn and apply easy onset with words/phrases/sentences/in running speech with min cues 4. Patient will learn and apply frontal focus with words/phrases/sentences/in running speech with min cues  Please contact the center should you have any additional questions or concerns, at [phone].  Marianela Rodriguez MA, CCC-SLP Senior Speech-Language Pathologist

## 2024-06-10 NOTE — ASSESSMENT
[FreeTextEntry1] : VOICE EVALUATION  Patient Name: Abby Rodriguez Date of Evaluation: 6/10/24 : 1955 Primary Diagnosis: Dysphonia R49.0 Treatment Diagnosis: Dysphonia R49.0 Referring Physician: Dr. Freeman Luu Procedure Code: 18844  REASON FOR REFERRAL: Abby Rodriguez is a 68-year-old female who participated in a comprehensive voice evaluation to assess vocal functioning in light of diagnosis of severe muscle tension dysphonia, referred by Dr. Luu.   HISTORY OF PRESENTING ILLNESS: Patient arrived unaccompanied. Upon probing, patient reported ongoing vocal difficulties following a ACDF surgery completed in 2023. She noted prior normal voicing. Following surgery and currently, she is noted to have loss of vocal range and projection, lack of ability to sing and notes that her voice sounds like a "frog."  Upon probing, she denies extensive voice use/heavy vocal demands. 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 Pt went for prior voice therapy sessions but stopped as she did not see a benefit. She reports increased willingness to participate at this time. Upon probing, she reported she is adequately hydrated and does not smoke.   Medical History: Abnormal EKG (794.31) (R94.31) Acute internal derangement of left knee (717.9) (M23.92) Cervical herniated disc (722.0) (M50.20) Cervical radiculopathy (723.4) (M54.12) MANLEY (dyspnea on exertion) (786.09) (R06.09) Dyslipidemia (272.4) (E78.5) Elevated erythrocyte sedimentation rate (790.1) (R70.0) Hernia (553.9) (K46.9) History of cardiac murmur (V12.59) (Z86.79) History of hypertension (V12.59) (Z86.79) Impaired fasting blood sugar (790.21) (R73.01) Left arm weakness (729.89) (R29.898) Morbid obesity (278.01) (E66.01) Neck pain on left side (723.1) (M54.2) Osteopenia (733.90) (M85.80) Postop check (V67.00) (Z09) Primary osteoarthritis of left knee (715.16) (M17.12) S/P cervical spinal fusion (V45.4) (Z98.1)     Laryngoscopy 24 Findings: Findings: Supraglottis: no masses or lesions Glottis: Structure:  Right: crisp and shows no lesions or masses  Left: crisp and shows no lesions or masses  Mobility:  Right: normal  Left: normal  Amplitude:  Right: normal  Left: normal  Closure: complete  Wave symmetry: symmetric Subglottis: no masses or lesions within the visualized subglottis Visualized airway is widely patent. Other: severe lateral supraglottic squeeze on phonation.  MEDICATIONS: Were reviewed and can be located in the patient's chart Allergies: No reported food or drug allergies  CLINICAL FINDINGS Oral Peripheral Assessment / Detailed Head and Neck Exam Structures: Within Functional Limits Symmetry: Within functional limits Dentition: WFLs Soft Palate: WFLs Labial: WFLs Lingual: WFLs Mandibular: WFL Buccal: WFLs Secretions: WFLs Volitional Swallow: Adequate Volitional Cough: WFLs Cognition/Communication: WFLs Motor Speech: WFLs  CLINICAL FINDINGS: Perceptual Voice Analysis Vocal Quality: Strained, hoarse Severity: Mild Loudness Level: WF Pitch: Perceptually low, pitch breaks during sustained phonation Musculoskeletal Tension: Present Location: Neck, clavicle, shoulders Respiration: Thoracic/Clavicular Tone Focus: Back Type of Onset: hard glottal attack Laryngeal: [facility] Maximum Phonation Duration: 18.29 seconds, WFLs  VISIPITCH DATA: Sustained Phonation Fo: 219 Hz (normal - 143-235 Hz) WFLs Intensity: 62 dB (Normal- 65-80 dB) Reduced Percent Voiced: 100% / 100 - WFLs Range: 12 Hz (Normal- <20 Hz) WFLs  Conversational Passage: Fo: 156 Hz (normal - 143-235 Hz) WFLs  Intensity: 58 dB (Normal- 65-80 dB) Reduced   Range: max:  min:   INTERPRETATION: Mild dysphonia  PROGNOSIS: Good with therapeutic intervention and patient participation  RECOMMENDATIONS: 1) Voice Therapy (CPT - 87438) is recommended 1x/week for 6-8 weeks.  2) Adhere to vocal hygiene and LPRD lifestyle modifications 3) Follow up with physician as directed  EDUCATION: Verbal and written educational information and instruction were provided to the patient.  Treatment Plan Long Term Goals: 1. The patient will Demonstrate improved vocal quality/loudness/intonation for sustained vocalization/speech at word/phrase/sentence/conversational level.  Short-Term Goals: 1. Patient will verbalize strategies to reduce vocal hyperfunction with min cues 2. Patient will establish a diaphragmatic breathing pattern at rest and during speech with minimum cues 3. Learn and apply easy onset with words/phrases/sentences/in running speech with min cues 4. Patient will learn and apply frontal focus with words/phrases/sentences/in running speech with min cues  Please contact the center should you have any additional questions or concerns, at [phone].  Marianela Rodriguez MA, CCC-SLP Senior Speech-Language Pathologist

## 2024-06-21 ENCOUNTER — APPOINTMENT (OUTPATIENT)
Dept: OTOLARYNGOLOGY | Facility: CLINIC | Age: 69
End: 2024-06-21
Payer: MEDICARE

## 2024-06-21 PROCEDURE — 92507 TX SP LANG VOICE COMM INDIV: CPT | Mod: GN

## 2024-06-27 ENCOUNTER — APPOINTMENT (OUTPATIENT)
Dept: OTOLARYNGOLOGY | Facility: CLINIC | Age: 69
End: 2024-06-27
Payer: MEDICARE

## 2024-06-27 PROCEDURE — 92507 TX SP LANG VOICE COMM INDIV: CPT | Mod: GN

## 2024-07-02 ENCOUNTER — OUTPATIENT (OUTPATIENT)
Dept: OUTPATIENT SERVICES | Facility: HOSPITAL | Age: 69
LOS: 1 days | End: 2024-07-02
Payer: MEDICARE

## 2024-07-02 ENCOUNTER — APPOINTMENT (OUTPATIENT)
Dept: RADIOLOGY | Facility: HOSPITAL | Age: 69
End: 2024-07-02
Payer: MEDICARE

## 2024-07-02 ENCOUNTER — APPOINTMENT (OUTPATIENT)
Dept: ENDOCRINOLOGY | Facility: CLINIC | Age: 69
End: 2024-07-02
Payer: MEDICARE

## 2024-07-02 VITALS — HEIGHT: 64 IN | BODY MASS INDEX: 44.39 KG/M2 | WEIGHT: 260 LBS

## 2024-07-02 VITALS
OXYGEN SATURATION: 98 % | RESPIRATION RATE: 18 BRPM | HEART RATE: 86 BPM | TEMPERATURE: 98.4 F | SYSTOLIC BLOOD PRESSURE: 144 MMHG | HEIGHT: 64 IN | DIASTOLIC BLOOD PRESSURE: 78 MMHG

## 2024-07-02 DIAGNOSIS — Z00.8 ENCOUNTER FOR OTHER GENERAL EXAMINATION: ICD-10-CM

## 2024-07-02 DIAGNOSIS — I63.9 CEREBRAL INFARCTION, UNSPECIFIED: ICD-10-CM

## 2024-07-02 DIAGNOSIS — E66.01 MORBID (SEVERE) OBESITY DUE TO EXCESS CALORIES: ICD-10-CM

## 2024-07-02 DIAGNOSIS — R73.03 PREDIABETES.: ICD-10-CM

## 2024-07-02 PROCEDURE — 72040 X-RAY EXAM NECK SPINE 2-3 VW: CPT | Mod: 26

## 2024-07-02 PROCEDURE — 73630 X-RAY EXAM OF FOOT: CPT | Mod: 26,RT

## 2024-07-02 PROCEDURE — 73562 X-RAY EXAM OF KNEE 3: CPT | Mod: 26,RT

## 2024-07-02 PROCEDURE — 73562 X-RAY EXAM OF KNEE 3: CPT

## 2024-07-02 PROCEDURE — 72040 X-RAY EXAM NECK SPINE 2-3 VW: CPT

## 2024-07-02 PROCEDURE — 99204 OFFICE O/P NEW MOD 45 MIN: CPT

## 2024-07-02 PROCEDURE — 73630 X-RAY EXAM OF FOOT: CPT

## 2024-07-02 RX ORDER — SEMAGLUTIDE 0.25 MG/.5ML
0.25 INJECTION, SOLUTION SUBCUTANEOUS
Qty: 1 | Refills: 4 | Status: ACTIVE | COMMUNITY
Start: 2024-07-02 | End: 1900-01-01

## 2024-07-03 ENCOUNTER — APPOINTMENT (OUTPATIENT)
Dept: OTOLARYNGOLOGY | Facility: CLINIC | Age: 69
End: 2024-07-03

## 2024-07-09 ENCOUNTER — APPOINTMENT (OUTPATIENT)
Dept: OTOLARYNGOLOGY | Facility: CLINIC | Age: 69
End: 2024-07-09
Payer: MEDICARE

## 2024-07-09 ENCOUNTER — APPOINTMENT (OUTPATIENT)
Dept: OTOLARYNGOLOGY | Facility: CLINIC | Age: 69
End: 2024-07-09

## 2024-07-09 PROCEDURE — 92507 TX SP LANG VOICE COMM INDIV: CPT | Mod: GN

## 2024-07-10 ENCOUNTER — APPOINTMENT (OUTPATIENT)
Dept: OTOLARYNGOLOGY | Facility: CLINIC | Age: 69
End: 2024-07-10

## 2024-07-17 ENCOUNTER — APPOINTMENT (OUTPATIENT)
Dept: OTOLARYNGOLOGY | Facility: CLINIC | Age: 69
End: 2024-07-17
Payer: MEDICARE

## 2024-07-17 PROCEDURE — 92507 TX SP LANG VOICE COMM INDIV: CPT | Mod: GN

## 2024-07-18 ENCOUNTER — APPOINTMENT (OUTPATIENT)
Dept: OTOLARYNGOLOGY | Facility: CLINIC | Age: 69
End: 2024-07-18
Payer: MEDICARE

## 2024-07-18 VITALS — HEART RATE: 70 BPM | SYSTOLIC BLOOD PRESSURE: 154 MMHG | DIASTOLIC BLOOD PRESSURE: 98 MMHG

## 2024-07-18 VITALS — WEIGHT: 260 LBS | HEIGHT: 64 IN | BODY MASS INDEX: 44.39 KG/M2

## 2024-07-18 DIAGNOSIS — Z98.1 ARTHRODESIS STATUS: ICD-10-CM

## 2024-07-18 PROCEDURE — 99214 OFFICE O/P EST MOD 30 MIN: CPT | Mod: 25

## 2024-07-18 PROCEDURE — 31579 LARYNGOSCOPY TELESCOPIC: CPT

## 2024-07-24 ENCOUNTER — APPOINTMENT (OUTPATIENT)
Dept: OTOLARYNGOLOGY | Facility: CLINIC | Age: 69
End: 2024-07-24

## 2024-07-25 ENCOUNTER — APPOINTMENT (OUTPATIENT)
Dept: OTOLARYNGOLOGY | Facility: CLINIC | Age: 69
End: 2024-07-25
Payer: MEDICARE

## 2024-07-25 VITALS — WEIGHT: 260 LBS | HEIGHT: 64 IN | BODY MASS INDEX: 44.39 KG/M2

## 2024-07-25 VITALS — HEART RATE: 70 BPM | SYSTOLIC BLOOD PRESSURE: 172 MMHG | DIASTOLIC BLOOD PRESSURE: 94 MMHG

## 2024-07-25 DIAGNOSIS — R49.0 DYSPHONIA: ICD-10-CM

## 2024-07-25 DIAGNOSIS — I63.9 CEREBRAL INFARCTION, UNSPECIFIED: ICD-10-CM

## 2024-07-25 PROCEDURE — 31575 DIAGNOSTIC LARYNGOSCOPY: CPT

## 2024-07-25 PROCEDURE — 99214 OFFICE O/P EST MOD 30 MIN: CPT | Mod: 25

## 2024-07-25 NOTE — HISTORY OF PRESENT ILLNESS
[de-identified] : TREV POZO is a 68 year old female who presents to the [facility] for follow up of her MTD. I last saw the patient on 7/18/24. Returns today for in office bilateral injection laryngoplasty.  Previously reported: difficulty phonating. She is referred by Dr. Kelli Fonseca. S/p ACDF 4/4/23. She now does note periods of normal voicing. Reports voice was 55% normal immediately after surgery and has slowly improved to 65% of normal now. She does not note specific difficulties with swallowing. She does not note frequent classic heartburn symptoms. Smoking history: none. Has issues with projection and getting loud and also states can no longer sing. Has much more issues with upper range. Was previously seen by outside ENT and told normal FOE. VOCAL DEMANDS: Her vocal demands are primarily those of general conversation Has previously done 2 oustide sessions of voice therapy.  Prior Pertinent Procedures: 4/4/23: ACDF C4-C5

## 2024-07-25 NOTE — ASSESSMENT
[FreeTextEntry1] : Assessment/Plan: #1 Muscle tension dysphonia #2 Hx of CVA  Procedure could not be accomplished today due to neck anatomy.  Discussed with patient option of more voice therapy or DL injection laryngoplasty with Charley in OR.  The risks, benefits, and alternatives to care were discussed with the patient and understanding expressed.  Elected for OR.   Total time: 30 minutes; total time does not include time spent performing the procedure and its related elements. It does include all other face to face and non face to face pre and post visit tasks performed on the same date of service.

## 2024-07-25 NOTE — PROCEDURE
[FreeTextEntry3] : Restylane injection of bilateral vocal folds: SURGEON: Freeman Luu MD   Substance Injected: Restylane   Amounts and Location Injected: 0 ml injected into the bilateral paraglottic space   FINDINGS: Muscle tension dysphonia   NARRATIVE: The patient was brought to clinic and consented for the procedure. Their nose was topicalized with oxymetazoline nasal spray and 2% lidocaine.   After topicalization, a surgical pause was held to confirm correct patient, procedure, site, allergies, equipment, and position. Using a 27-gauge needle, approximately 2cc of 2% lidocaine in 1:100,000 epinephrine was injected subcutaneously at the thyroid prominence.   A flexible distal-chip video laryngoscope was introduced into the right nare. The scope was positioned above the epiglottis. Approximately 4ml of 4% lidocaine was dripped onto the vocal cords through the working channel of the scope. This produced a strong cough, anesthetizing the vocal folds. After allowing several minutes for analgesia, the 1 ml of Restylane on a 25-gauge needle (that was bent appropriately) was inserted into the skin that had previously been anesthetized just superior to the thyroid notch. The tip of the needle was seen tenting up the mucosa of the petiole just above the anterior commissure. The needle then pierced through this mucosa but could not reach the folds even with 2 inch needle.   DISPOSITION: The patient was discharged with instructions not to eat or drink for about 60 minutes to allow the topical anesthesia to wear off. [de-identified] : Procedure: Transnasal flexible laryngoscopy  Description: Informed consent was obtained from the patient prior to the procedure. The patient was seated in the clinic chair. Topical anesthesia was achieved by first spraying the nasal cavities with 4% lidocaine and 1% phenylephrine.   Exam: This demonstrates a clear vallecula and crisp epiglottis. The aryepiglottic folds are intact and symmetric bilaterally. The hypopharynx does not demonstrate pooling. The interarytenoid space demonstrates no lesions. It does not demonstrate pachydermia. The false vocal folds are symmetric and without lesions or masses. False fold voicing (plica ventricularis) is not noted today. The true vocal folds show normal and symmetric motion bilaterally. There is no paradoxical motion. The right medial edge is crisp and shows no lesions or masses. The left medial edge is crisp and shows no lesions or masses. The mucosal covering is minimally edematous. There is not erythema present today. There are no obvious vascular ectasias present. The vocal processes do not demonstrate granulomas. The subglottis and proximal trachea is clear and unobstructed to the limits of the examination today.

## 2024-07-25 NOTE — HISTORY OF PRESENT ILLNESS
[de-identified] : TREV POZO is a 68 year old female who presents to the [facility] for follow up of her MTD. I last saw the patient on 7/18/24. Returns today for in office bilateral injection laryngoplasty.  Previously reported: difficulty phonating. She is referred by Dr. Kelli Fonseca. S/p ACDF 4/4/23. She now does note periods of normal voicing. Reports voice was 55% normal immediately after surgery and has slowly improved to 65% of normal now. She does not note specific difficulties with swallowing. She does not note frequent classic heartburn symptoms. Smoking history: none. Has issues with projection and getting loud and also states can no longer sing. Has much more issues with upper range. Was previously seen by outside ENT and told normal FOE. VOCAL DEMANDS: Her vocal demands are primarily those of general conversation Has previously done 2 oustide sessions of voice therapy.  Prior Pertinent Procedures: 4/4/23: ACDF C4-C5

## 2024-07-25 NOTE — PROCEDURE
[FreeTextEntry3] : Restylane injection of bilateral vocal folds: SURGEON: Freeman Luu MD   Substance Injected: Restylane   Amounts and Location Injected: 0 ml injected into the bilateral paraglottic space   FINDINGS: Muscle tension dysphonia   NARRATIVE: The patient was brought to clinic and consented for the procedure. Their nose was topicalized with oxymetazoline nasal spray and 2% lidocaine.   After topicalization, a surgical pause was held to confirm correct patient, procedure, site, allergies, equipment, and position. Using a 27-gauge needle, approximately 2cc of 2% lidocaine in 1:100,000 epinephrine was injected subcutaneously at the thyroid prominence.   A flexible distal-chip video laryngoscope was introduced into the right nare. The scope was positioned above the epiglottis. Approximately 4ml of 4% lidocaine was dripped onto the vocal cords through the working channel of the scope. This produced a strong cough, anesthetizing the vocal folds. After allowing several minutes for analgesia, the 1 ml of Restylane on a 25-gauge needle (that was bent appropriately) was inserted into the skin that had previously been anesthetized just superior to the thyroid notch. The tip of the needle was seen tenting up the mucosa of the petiole just above the anterior commissure. The needle then pierced through this mucosa but could not reach the folds even with 2 inch needle.   DISPOSITION: The patient was discharged with instructions not to eat or drink for about 60 minutes to allow the topical anesthesia to wear off. [de-identified] : Procedure: Transnasal flexible laryngoscopy  Description: Informed consent was obtained from the patient prior to the procedure. The patient was seated in the clinic chair. Topical anesthesia was achieved by first spraying the nasal cavities with 4% lidocaine and 1% phenylephrine.   Exam: This demonstrates a clear vallecula and crisp epiglottis. The aryepiglottic folds are intact and symmetric bilaterally. The hypopharynx does not demonstrate pooling. The interarytenoid space demonstrates no lesions. It does not demonstrate pachydermia. The false vocal folds are symmetric and without lesions or masses. False fold voicing (plica ventricularis) is not noted today. The true vocal folds show normal and symmetric motion bilaterally. There is no paradoxical motion. The right medial edge is crisp and shows no lesions or masses. The left medial edge is crisp and shows no lesions or masses. The mucosal covering is minimally edematous. There is not erythema present today. There are no obvious vascular ectasias present. The vocal processes do not demonstrate granulomas. The subglottis and proximal trachea is clear and unobstructed to the limits of the examination today.

## 2024-07-29 ENCOUNTER — APPOINTMENT (OUTPATIENT)
Dept: OTOLARYNGOLOGY | Facility: CLINIC | Age: 69
End: 2024-07-29

## 2024-07-30 ENCOUNTER — APPOINTMENT (OUTPATIENT)
Dept: OTOLARYNGOLOGY | Facility: CLINIC | Age: 69
End: 2024-07-30

## 2024-08-06 ENCOUNTER — APPOINTMENT (OUTPATIENT)
Dept: OTOLARYNGOLOGY | Facility: CLINIC | Age: 69
End: 2024-08-06

## 2024-08-13 ENCOUNTER — APPOINTMENT (OUTPATIENT)
Dept: OTOLARYNGOLOGY | Facility: CLINIC | Age: 69
End: 2024-08-13

## 2024-08-15 ENCOUNTER — APPOINTMENT (OUTPATIENT)
Dept: ULTRASOUND IMAGING | Facility: CLINIC | Age: 69
End: 2024-08-15

## 2024-08-15 ENCOUNTER — APPOINTMENT (OUTPATIENT)
Dept: MAMMOGRAPHY | Facility: CLINIC | Age: 69
End: 2024-08-15
Payer: MEDICARE

## 2024-08-15 PROCEDURE — 77063 BREAST TOMOSYNTHESIS BI: CPT

## 2024-08-15 PROCEDURE — 76641 ULTRASOUND BREAST COMPLETE: CPT | Mod: 50,GY

## 2024-08-15 PROCEDURE — 77067 SCR MAMMO BI INCL CAD: CPT

## 2024-08-21 ENCOUNTER — APPOINTMENT (OUTPATIENT)
Dept: OTOLARYNGOLOGY | Facility: HOSPITAL | Age: 69
End: 2024-08-21

## 2024-10-17 ENCOUNTER — APPOINTMENT (OUTPATIENT)
Dept: OTOLARYNGOLOGY | Facility: CLINIC | Age: 69
End: 2024-10-17

## 2024-11-11 ENCOUNTER — APPOINTMENT (OUTPATIENT)
Dept: ENDOCRINOLOGY | Facility: CLINIC | Age: 69
End: 2024-11-11
Payer: MEDICARE

## 2024-11-11 VITALS
HEART RATE: 73 BPM | TEMPERATURE: 98.2 F | HEIGHT: 64 IN | WEIGHT: 260 LBS | RESPIRATION RATE: 18 BRPM | OXYGEN SATURATION: 97 % | SYSTOLIC BLOOD PRESSURE: 136 MMHG | DIASTOLIC BLOOD PRESSURE: 82 MMHG | BODY MASS INDEX: 44.39 KG/M2

## 2024-11-11 DIAGNOSIS — R73.03 PREDIABETES.: ICD-10-CM

## 2024-11-11 DIAGNOSIS — E66.01 MORBID (SEVERE) OBESITY DUE TO EXCESS CALORIES: ICD-10-CM

## 2024-11-11 PROCEDURE — 99214 OFFICE O/P EST MOD 30 MIN: CPT

## 2024-12-06 ENCOUNTER — OFFICE (OUTPATIENT)
Dept: URBAN - METROPOLITAN AREA CLINIC 104 | Facility: CLINIC | Age: 69
Setting detail: OPHTHALMOLOGY
End: 2024-12-06
Payer: MEDICARE

## 2024-12-06 DIAGNOSIS — H43.813: ICD-10-CM

## 2024-12-06 DIAGNOSIS — H35.439: ICD-10-CM

## 2024-12-06 DIAGNOSIS — H35.3133: ICD-10-CM

## 2024-12-06 PROCEDURE — 92004 COMPRE OPH EXAM NEW PT 1/>: CPT | Performed by: OPHTHALMOLOGY

## 2024-12-06 PROCEDURE — 92134 CPTRZ OPH DX IMG PST SGM RTA: CPT | Performed by: OPHTHALMOLOGY

## 2024-12-06 ASSESSMENT — CONFRONTATIONAL VISUAL FIELD TEST (CVF)
OD_FINDINGS: FULL
OS_FINDINGS: FULL

## 2024-12-06 ASSESSMENT — VISUAL ACUITY
OS_BCVA: 20/25-1
OD_BCVA: 20/25+1

## 2024-12-10 ENCOUNTER — APPOINTMENT (OUTPATIENT)
Dept: ENDOCRINOLOGY | Facility: CLINIC | Age: 69
End: 2024-12-10
Payer: MEDICARE

## 2024-12-10 VITALS
TEMPERATURE: 97.5 F | OXYGEN SATURATION: 94 % | WEIGHT: 264 LBS | DIASTOLIC BLOOD PRESSURE: 82 MMHG | HEART RATE: 71 BPM | RESPIRATION RATE: 18 BRPM | SYSTOLIC BLOOD PRESSURE: 142 MMHG | BODY MASS INDEX: 45.07 KG/M2 | HEIGHT: 64 IN

## 2024-12-10 DIAGNOSIS — R73.03 PREDIABETES.: ICD-10-CM

## 2024-12-10 DIAGNOSIS — E66.01 MORBID (SEVERE) OBESITY DUE TO EXCESS CALORIES: ICD-10-CM

## 2024-12-10 PROCEDURE — 99214 OFFICE O/P EST MOD 30 MIN: CPT

## 2025-01-06 ENCOUNTER — APPOINTMENT (OUTPATIENT)
Dept: ENDOCRINOLOGY | Facility: CLINIC | Age: 70
End: 2025-01-06
Payer: MEDICARE

## 2025-01-06 VITALS
HEART RATE: 71 BPM | BODY MASS INDEX: 43.54 KG/M2 | OXYGEN SATURATION: 97 % | HEIGHT: 64 IN | WEIGHT: 255 LBS | SYSTOLIC BLOOD PRESSURE: 146 MMHG | RESPIRATION RATE: 18 BRPM | TEMPERATURE: 97.4 F | DIASTOLIC BLOOD PRESSURE: 80 MMHG

## 2025-01-06 DIAGNOSIS — E66.01 MORBID (SEVERE) OBESITY DUE TO EXCESS CALORIES: ICD-10-CM

## 2025-01-06 DIAGNOSIS — R73.03 PREDIABETES.: ICD-10-CM

## 2025-01-06 PROCEDURE — 99214 OFFICE O/P EST MOD 30 MIN: CPT

## 2025-02-03 ENCOUNTER — APPOINTMENT (OUTPATIENT)
Dept: ENDOCRINOLOGY | Facility: CLINIC | Age: 70
End: 2025-02-03
Payer: MEDICARE

## 2025-02-03 VITALS
DIASTOLIC BLOOD PRESSURE: 82 MMHG | HEART RATE: 78 BPM | WEIGHT: 250 LBS | HEIGHT: 64 IN | BODY MASS INDEX: 42.68 KG/M2 | RESPIRATION RATE: 18 BRPM | SYSTOLIC BLOOD PRESSURE: 144 MMHG | TEMPERATURE: 97.8 F | OXYGEN SATURATION: 98 %

## 2025-02-03 DIAGNOSIS — E66.01 MORBID (SEVERE) OBESITY DUE TO EXCESS CALORIES: ICD-10-CM

## 2025-02-03 DIAGNOSIS — R73.03 PREDIABETES.: ICD-10-CM

## 2025-02-03 PROCEDURE — 99214 OFFICE O/P EST MOD 30 MIN: CPT

## 2025-03-05 ENCOUNTER — APPOINTMENT (OUTPATIENT)
Dept: ENDOCRINOLOGY | Facility: CLINIC | Age: 70
End: 2025-03-05
Payer: MEDICARE

## 2025-03-05 VITALS
OXYGEN SATURATION: 96 % | WEIGHT: 244 LBS | TEMPERATURE: 97.7 F | RESPIRATION RATE: 17 BRPM | HEIGHT: 64 IN | BODY MASS INDEX: 41.66 KG/M2 | SYSTOLIC BLOOD PRESSURE: 140 MMHG | HEART RATE: 85 BPM | DIASTOLIC BLOOD PRESSURE: 84 MMHG

## 2025-03-05 DIAGNOSIS — E66.01 MORBID (SEVERE) OBESITY DUE TO EXCESS CALORIES: ICD-10-CM

## 2025-03-05 DIAGNOSIS — R73.03 PREDIABETES.: ICD-10-CM

## 2025-03-05 PROCEDURE — 99214 OFFICE O/P EST MOD 30 MIN: CPT

## 2025-04-07 ENCOUNTER — APPOINTMENT (OUTPATIENT)
Dept: ENDOCRINOLOGY | Facility: CLINIC | Age: 70
End: 2025-04-07
Payer: MEDICARE

## 2025-04-07 VITALS
WEIGHT: 237.38 LBS | BODY MASS INDEX: 40.52 KG/M2 | DIASTOLIC BLOOD PRESSURE: 84 MMHG | RESPIRATION RATE: 17 BRPM | SYSTOLIC BLOOD PRESSURE: 132 MMHG | HEIGHT: 64 IN | TEMPERATURE: 97.6 F

## 2025-04-07 DIAGNOSIS — E66.01 MORBID (SEVERE) OBESITY DUE TO EXCESS CALORIES: ICD-10-CM

## 2025-04-07 DIAGNOSIS — R73.03 PREDIABETES.: ICD-10-CM

## 2025-04-07 PROCEDURE — 99214 OFFICE O/P EST MOD 30 MIN: CPT

## 2025-06-26 ENCOUNTER — APPOINTMENT (OUTPATIENT)
Dept: CARDIOLOGY | Facility: CLINIC | Age: 70
End: 2025-06-26
Payer: MEDICARE

## 2025-06-26 ENCOUNTER — NON-APPOINTMENT (OUTPATIENT)
Age: 70
End: 2025-06-26

## 2025-06-26 VITALS
WEIGHT: 228 LBS | HEIGHT: 64 IN | SYSTOLIC BLOOD PRESSURE: 154 MMHG | OXYGEN SATURATION: 97 % | HEART RATE: 59 BPM | BODY MASS INDEX: 38.93 KG/M2 | DIASTOLIC BLOOD PRESSURE: 76 MMHG

## 2025-06-26 PROBLEM — R03.0 ELEVATED BLOOD PRESSURE READING: Status: ACTIVE | Noted: 2025-06-26

## 2025-06-26 PROCEDURE — 93000 ELECTROCARDIOGRAM COMPLETE: CPT

## 2025-06-26 PROCEDURE — 99204 OFFICE O/P NEW MOD 45 MIN: CPT

## 2025-06-26 RX ORDER — VALSARTAN 80 MG/1
80 TABLET, COATED ORAL
Qty: 1 | Refills: 3 | Status: ACTIVE | COMMUNITY
Start: 2025-06-26 | End: 1900-01-01

## 2025-07-09 ENCOUNTER — APPOINTMENT (OUTPATIENT)
Dept: ENDOCRINOLOGY | Facility: CLINIC | Age: 70
End: 2025-07-09
Payer: MEDICARE

## 2025-07-09 VITALS
HEIGHT: 63 IN | DIASTOLIC BLOOD PRESSURE: 70 MMHG | BODY MASS INDEX: 39.69 KG/M2 | WEIGHT: 224 LBS | TEMPERATURE: 97.5 F | SYSTOLIC BLOOD PRESSURE: 130 MMHG | HEART RATE: 80 BPM | OXYGEN SATURATION: 97 %

## 2025-07-09 PROBLEM — E66.9 OBESITY: Status: ACTIVE | Noted: 2025-07-09

## 2025-07-09 PROCEDURE — 99214 OFFICE O/P EST MOD 30 MIN: CPT

## 2025-07-24 ENCOUNTER — APPOINTMENT (OUTPATIENT)
Dept: CARDIOLOGY | Facility: CLINIC | Age: 70
End: 2025-07-24
Payer: MEDICARE

## 2025-07-24 VITALS
HEIGHT: 63 IN | BODY MASS INDEX: 39.16 KG/M2 | WEIGHT: 221 LBS | SYSTOLIC BLOOD PRESSURE: 123 MMHG | HEART RATE: 60 BPM | OXYGEN SATURATION: 100 % | DIASTOLIC BLOOD PRESSURE: 79 MMHG

## 2025-07-24 DIAGNOSIS — R06.09 OTHER FORMS OF DYSPNEA: ICD-10-CM

## 2025-07-24 PROCEDURE — 99213 OFFICE O/P EST LOW 20 MIN: CPT

## 2025-07-24 PROCEDURE — 93306 TTE W/DOPPLER COMPLETE: CPT

## 2025-08-08 ENCOUNTER — APPOINTMENT (OUTPATIENT)
Dept: MAMMOGRAPHY | Facility: CLINIC | Age: 70
End: 2025-08-08
Payer: MEDICARE

## 2025-08-08 ENCOUNTER — APPOINTMENT (OUTPATIENT)
Dept: RADIOLOGY | Facility: CLINIC | Age: 70
End: 2025-08-08
Payer: MEDICARE

## 2025-08-08 ENCOUNTER — APPOINTMENT (OUTPATIENT)
Dept: ULTRASOUND IMAGING | Facility: CLINIC | Age: 70
End: 2025-08-08
Payer: MEDICARE

## 2025-08-08 PROCEDURE — 76641 ULTRASOUND BREAST COMPLETE: CPT | Mod: 50,TC,GA

## 2025-08-08 PROCEDURE — 77067 SCR MAMMO BI INCL CAD: CPT | Mod: TC

## 2025-08-08 PROCEDURE — 77080 DXA BONE DENSITY AXIAL: CPT

## 2025-08-08 PROCEDURE — 77063 BREAST TOMOSYNTHESIS BI: CPT | Mod: 26

## (undated) DEVICE — GLV 8 PROTEXIS (WHITE)

## (undated) DEVICE — VENODYNE/SCD SLEEVE CALF LARGE

## (undated) DEVICE — DRAPE MAYO STAND 30"

## (undated) DEVICE — DRSG TAPE HYPAFIX 4"

## (undated) DEVICE — POSITIONER FOAM EGG CRATE ULNAR 2PCS (PINK)

## (undated) DEVICE — NDL SPINAL 18G X 3.5" (PINK)

## (undated) DEVICE — MIDAS REX MR8 BALL FLUTED SM BORE 3MM X 10CM

## (undated) DEVICE — ELCTR SUBDERMAL NDL CLASSIC 1.5M X 59" (6 COLOR)

## (undated) DEVICE — GLV 7.5 PROTEXIS (WHITE)

## (undated) DEVICE — PREP CHLORAPREP HI-LITE ORANGE 26ML

## (undated) DEVICE — SUT BIOSYN 4-0 18" P-12

## (undated) DEVICE — DRSG MASTISOL

## (undated) DEVICE — PACK LUMBAR LAMI

## (undated) DEVICE — DRSG TELFA 3 X 8

## (undated) DEVICE — DRSG STERISTRIPS 0.5 X 4"

## (undated) DEVICE — DRAPE 3/4 SHEET W REINFORCEMENT 56X77"

## (undated) DEVICE — MIDAS REX MR8 BALL FLUTED SM BORE 5MM X 10CM

## (undated) DEVICE — ELCTR SUBDERMAL NDL 27G X 1/2" WITH TWISTED PAIR

## (undated) DEVICE — DRAPE EQUIPMENT BANDED BAG 30 X 30" (SHOWER CAP)

## (undated) DEVICE — MIDAS REX MR7 LUBRICANT DIFFUSER CARTRIDGE

## (undated) DEVICE — BIPOLAR FORCEP SYMMETRY BAYONET 7" X 1.5MM SMOOTH (SILVER)

## (undated) DEVICE — DRAPE 1/2 SHEET 40X57"

## (undated) DEVICE — FOLEY TRAY 16FR 5CC LTX UMETER CLOSED

## (undated) DEVICE — SUT ETHILON 3-0 30" FS-1

## (undated) DEVICE — SOL IRR POUR H2O 250ML

## (undated) DEVICE — SUT SOFSILK 2-0 18" TIES

## (undated) DEVICE — GLV 7 PROTEXIS (WHITE)

## (undated) DEVICE — SOL IRR POUR NS 0.9% 500ML

## (undated) DEVICE — DRAPE TOWEL BLUE 17" X 24"

## (undated) DEVICE — DRAIN JACKSON PRATT 3 SPRING RESERVOIR W 10FR PVC DRAIN

## (undated) DEVICE — GLV 6.5 PROTEXIS (WHITE)

## (undated) DEVICE — SPONGE PEANUT AUTO COUNT

## (undated) DEVICE — MIDAS REX MR8 BALL FLUTED SM BORE 4MM X 10CM

## (undated) DEVICE — DRAPE C ARM UNIVERSAL

## (undated) DEVICE — WOUND IRR SURGIPHOR

## (undated) DEVICE — SPECIMEN CONTAINER 100ML

## (undated) DEVICE — GOWN TRIMAX LG

## (undated) DEVICE — ELCTR 4-DISC 20MM 49" (RED, BLUE, GREEN, BLACK)

## (undated) DEVICE — SUT VICRYL 3-0 18" X-1 (POP-OFF)

## (undated) DEVICE — SYR LUER LOK 20CC

## (undated) DEVICE — ELCTR SUBDERMAL CORKSCREW NDL 1.2MM

## (undated) DEVICE — WARMING BLANKET LOWER ADULT